# Patient Record
Sex: MALE | Race: WHITE | NOT HISPANIC OR LATINO | Employment: FULL TIME | URBAN - NONMETROPOLITAN AREA
[De-identification: names, ages, dates, MRNs, and addresses within clinical notes are randomized per-mention and may not be internally consistent; named-entity substitution may affect disease eponyms.]

---

## 2020-12-04 ENCOUNTER — HOSPITAL ENCOUNTER (EMERGENCY)
Facility: HOSPITAL | Age: 60
Discharge: HOME | End: 2020-12-04
Attending: NURSE PRACTITIONER | Admitting: NURSE PRACTITIONER
Payer: OTHER GOVERNMENT

## 2020-12-04 VITALS
SYSTOLIC BLOOD PRESSURE: 143 MMHG | TEMPERATURE: 96.4 F | HEART RATE: 64 BPM | RESPIRATION RATE: 16 BRPM | OXYGEN SATURATION: 99 % | DIASTOLIC BLOOD PRESSURE: 95 MMHG

## 2020-12-04 DIAGNOSIS — Z20.822 SUSPECTED 2019 NOVEL CORONAVIRUS INFECTION: Primary | ICD-10-CM

## 2020-12-04 DIAGNOSIS — U07.1 LAB TEST POSITIVE FOR DETECTION OF COVID-19 VIRUS: ICD-10-CM

## 2020-12-04 DIAGNOSIS — F17.210 CIGARETTE SMOKER: ICD-10-CM

## 2020-12-04 PROCEDURE — C9803 HOPD COVID-19 SPEC COLLECT: HCPCS

## 2020-12-04 PROCEDURE — G0463 HOSPITAL OUTPT CLINIC VISIT: HCPCS

## 2020-12-04 PROCEDURE — U0003 INFECTIOUS AGENT DETECTION BY NUCLEIC ACID (DNA OR RNA); SEVERE ACUTE RESPIRATORY SYNDROME CORONAVIRUS 2 (SARS-COV-2) (CORONAVIRUS DISEASE [COVID-19]), AMPLIFIED PROBE TECHNIQUE, MAKING USE OF HIGH THROUGHPUT TECHNOLOGIES AS DESCRIBED BY CMS-2020-01-R: HCPCS | Performed by: NURSE PRACTITIONER

## 2020-12-04 PROCEDURE — 99213 OFFICE O/P EST LOW 20 MIN: CPT | Performed by: NURSE PRACTITIONER

## 2020-12-04 ASSESSMENT — ENCOUNTER SYMPTOMS
ABDOMINAL PAIN: 0
SINUS PAIN: 0
DIARRHEA: 0
NAUSEA: 1
SORE THROAT: 0
LIGHT-HEADEDNESS: 1
MYALGIAS: 1
EYE PAIN: 0
HEADACHES: 1
EYE REDNESS: 0
FATIGUE: 0
TROUBLE SWALLOWING: 0
PALPITATIONS: 0
SINUS PRESSURE: 0
FEVER: 0
EYE ITCHING: 0
CHILLS: 0
SHORTNESS OF BREATH: 0
VOMITING: 0
COUGH: 0
PSYCHIATRIC NEGATIVE: 1

## 2020-12-04 NOTE — ED AVS SNAPSHOT
[facility]  [address] [place] [id number]  Phone: [phone]                                    Pollo Garrett   MRN: [id number]    Department: [facility]   Date of Visit: 12/4/2020           After Visit Summary Signature Page    I have received my discharge instructions, and my questions have been answered. I have discussed any challenges I see with this plan with the nurse or doctor.    ..........................................................................................................................................  Patient/Patient Representative Signature      ..........................................................................................................................................  Patient Representative Print Name and Relationship to Patient    ..................................................               ................................................  Date                                   Time    ..........................................................................................................................................  Reviewed by Signature/Title    ...................................................              ..............................................  Date                                               Time          22EPIC Rev 08/18

## 2020-12-05 NOTE — ED PROVIDER NOTES
History     Chief Complaint   Patient presents with     Covid 19 Testing     HPI  Pollo Garrett is a 60 year old male who presents to urgent care today for complaints of headache, lightheaded, muscle aches and nauseated over the last few days which have since resolved.  Does not have any current symptoms.  Patient states he had COVID + contact with someone 7 days ago.  Patient would like a COVID test.  Denies fever, chills, nausea, vomiting, diarrhea, SOB or chest pain. Denies any loss of taste or smell.  Denies pain. Current smoker- 1/2 pack per day.  No other concerns.      Allergies:  No Known Allergies    Problem List:    There are no active problems to display for this patient.       Past Medical History:    History reviewed. No pertinent past medical history.    Past Surgical History:    History reviewed. No pertinent surgical history.    Family History:    History reviewed. No pertinent family history.    Social History:  Marital Status:   [2]  Social History     Tobacco Use     Smoking status: None   Substance Use Topics     Alcohol use: None     Drug use: None      Medications:    No current outpatient medications on file.    Review of Systems   Constitutional: Negative for chills, fatigue and fever.   HENT: Negative for congestion, ear pain, sinus pressure, sinus pain, sore throat and trouble swallowing.    Eyes: Negative for pain, redness and itching.   Respiratory: Negative for cough and shortness of breath.    Cardiovascular: Negative for chest pain and palpitations.   Gastrointestinal: Positive for nausea. Negative for abdominal pain, diarrhea and vomiting.   Musculoskeletal: Positive for myalgias.   Neurological: Positive for light-headedness and headaches.   Psychiatric/Behavioral: Negative.      Physical Exam   BP: 143/95  Pulse: 64  Temp: 96.4  F (35.8  C)  Resp: 16  SpO2: 99 %    Physical Exam  Vitals signs and nursing note reviewed.   HENT:      Head: Normocephalic.      Right Ear:  Tympanic membrane, ear canal and external ear normal.      Left Ear: Tympanic membrane, ear canal and external ear normal.      Nose: Nose normal.      Mouth/Throat:      Mouth: Mucous membranes are moist.      Pharynx: Oropharynx is clear. No posterior oropharyngeal erythema.   Eyes:      Conjunctiva/sclera: Conjunctivae normal.      Pupils: Pupils are equal, round, and reactive to light.   Neck:      Musculoskeletal: Normal range of motion and neck supple. No neck rigidity.   Cardiovascular:      Rate and Rhythm: Normal rate and regular rhythm.      Pulses: Normal pulses.      Heart sounds: Normal heart sounds.   Pulmonary:      Effort: Pulmonary effort is normal.      Breath sounds: Normal breath sounds.   Lymphadenopathy:      Cervical: No cervical adenopathy.   Skin:     General: Skin is warm.      Capillary Refill: Capillary refill takes less than 2 seconds.   Neurological:      Mental Status: He is alert.   Psychiatric:         Mood and Affect: Mood normal.       ED Course     No results found for this or any previous visit (from the past 24 hour(s)).    Medications - No data to display    Assessments & Plan (with Medical Decision Making)     I have reviewed the nursing notes.    I have reviewed the findings, diagnosis, plan and need for follow up with the patient.  (Z20.828) Suspected 2019 novel coronavirus infection  (primary encounter diagnosis)  Plan: COVID-19 [facility] Loop Referral  Establish care with a primary care provider.    COVID TEST PENDING  COVID Test takes 48-72 hours to result.  If test is positive you will receive a phone call and if negative you will receive a [employer] message (if you have mychart) otherwise will receive a letter in the mail.      Symptomatic treatments recommended.  -Discussed that antibiotics would not help symptoms of viral URI. Education provided on symptoms of secondary bacterial infection such as new fever, chills, rigors, shortness of breath, increased work of  breathing, that can occur with viral URI and need for further evaluation, if they occur.   - Ensure you are staying hydrated by drinking plenty of fluids or eating foods such as popsicles, jello, pudding.  - Honey can be soothing for sore throat  - Warm salt water gurgles can help soothe sore throat  - Rest  - Humidifier can help with congestion and help keep mucus membranes such as throat and nose from drying out.  - Sleeping slightly propped up can help with congestion and postnasal drainage that can worsen cough at bedtime.  - As long as you have never been told to take Tylenol and/or Ibuprofen you can use them to manage fever and body aches per package instructions  Make sure you eat when you take ibuprofen to avoid stomach upset.  - OTC cough medications per package instructions to help with cough. Check to see if the cough/cold medication already has acetaminophen (Tylenol) in it. If it does avoid taking additional Tylenol.  - If sudden onset of new fever, worsening symptoms return for further evaluation.  - OTC nasal steroid such as Flonase can help decrease sinus inflammation to help with congestion.  - Education provided on symptoms of post-viral bacterial infections including ear infection and pneumonia. This would require re-evaluation for treatment.    Return to urgent care/ED with any worsening in condition or additional concerns.    There are no discharge medications for this patient.      Final diagnoses:   Suspected 2019 novel coronavirus infection       12/4/2020   [facility]     Susy Dexter NP  12/04/20 1119

## 2020-12-05 NOTE — ED TRIAGE NOTES
Pt presents with wanting a COVID test since he has the symptoms: headache, lightheadedness,nausea since today.

## 2020-12-05 NOTE — DISCHARGE INSTRUCTIONS
Establish care with a primary care provider.    COVID TEST PENDING  COVID Test takes 48-72 hours to result.  If test is positive you will receive a phone call and if negative you will receive a [employer] message (if you have mychart) otherwise will receive a letter in the mail.      Symptomatic treatments recommended.  -Discussed that antibiotics would not help symptoms of viral URI. Education provided on symptoms of secondary bacterial infection such as new fever, chills, rigors, shortness of breath, increased work of breathing, that can occur with viral URI and need for further evaluation, if they occur.   - Ensure you are staying hydrated by drinking plenty of fluids or eating foods such as popsicles, jello, pudding.  - Honey can be soothing for sore throat  - Warm salt water gurgles can help soothe sore throat  - Rest  - Humidifier can help with congestion and help keep mucus membranes such as throat and nose from drying out.  - Sleeping slightly propped up can help with congestion and postnasal drainage that can worsen cough at bedtime.  - As long as you have never been told to take Tylenol and/or Ibuprofen you can use them to manage fever and body aches per package instructions  Make sure you eat when you take ibuprofen to avoid stomach upset.  - OTC cough medications per package instructions to help with cough. Check to see if the cough/cold medication already has acetaminophen (Tylenol) in it. If it does avoid taking additional Tylenol.  - If sudden onset of new fever, worsening symptoms return for further evaluation.  - OTC nasal steroid such as Flonase can help decrease sinus inflammation to help with congestion.  - Education provided on symptoms of post-viral bacterial infections including ear infection and pneumonia. This would require re-evaluation for treatment.    Return to urgent care/ED with any worsening in condition or additional concerns.

## 2020-12-06 ENCOUNTER — TELEPHONE (OUTPATIENT)
Dept: LAB | Facility: HOSPITAL | Age: 60
End: 2020-12-06

## 2020-12-06 ENCOUNTER — NURSE TRIAGE (OUTPATIENT)
Dept: NURSING | Facility: CLINIC | Age: 60
End: 2020-12-06

## 2020-12-06 LAB
SARS-COV-2 RNA SPEC QL NAA+PROBE: ABNORMAL
SPECIMEN SOURCE: ABNORMAL

## 2020-12-06 NOTE — TELEPHONE ENCOUNTER
Coronavirus (COVID-19) Notification    Reason for call  Notify of POSITIVE  COVID-19 lab result, assess symptoms,  review [facility] recommendations    Lab Result   Lab test for 2019-nCoV rRt-PCR or SARS-COV-2 PCR  Oropharyngeal AND/OR nasopharyngeal swabs were POSITIVE for 2019-nCoV RNA [OR] SARS-COV-2 RNA (COVID-19) RNA     We have been unable to reach Patient by phone at this time to notify of their Positive COVID-19 result.  Left voicemail message requesting a call back to [phone] [facility] for results.        POSITIVE COVID-19 Letter sent.    Peg Campbell RN

## 2020-12-06 NOTE — TELEPHONE ENCOUNTER
"Calling for result. FNA reviewed letter. Pedro's wife expresses upset at the recommendations and says it's impossible for them to isolate from each other at home and she'll have to go out to stores. FNA advised they try neighbors, friends or delivery. Clarified guidelines for going back to work.    Advised to call back if symptoms worsen or with further questions.   Patient voiced understanding and will follow disposition.   Agatha Becker RN  [facility] Nurse Advisor          Coronavirus (COVID-19) Notification    Caller Name (Patient, parent, daughter/son, grandparent, etc)  Pedro    Reason for call  Notify of Positive Coronavirus (COVID-19) lab results, assess symptoms,  review [facility] recommendations    Lab Result    Lab test:  2019-nCoV rRt-PCR or SARS-CoV-2 PCR    Oropharyngeal AND/OR nasopharyngeal swabs is POSITIVE for 2019-nCoV RNA/SARS-COV-2 PCR (COVID-19 virus)    RN Recommendations/Instructions per [facility] Coronavirus COVID-19 recommendations    Brief introduction script  Introduce self then review script:  \"I am calling on behalf of [employer].  We were notified that your Coronavirus test (COVID-19) for was POSITIVE for the virus.  I have some information to relay to you but first I wanted to mention that the [place] Dept of Health will be contacting you shortly [it's possible MD already called Patient] to talk to you more about how you are feeling and other people you have had contact with who might now also have the virus.  Also, [facility] is Partnering with the [facility] for Covid-19 research, you may be contacted directly by research staff.\"    Assessment (Inquire about Patient's current symptoms)   Assessment   Current Symptoms at time of phone call: (if no symptoms, document No symptoms] Stuffy nose   Symptoms onset (if applicable) 12/2     If at time of call, Patients symptoms hare worsened, the Patient should contact 911 or have someone drive them to Emergency Dept " promptly:      If Patient calling 911, inform 911 personal that you have tested positive for the Coronavirus (COVID-19).  Place mask on and await 911 to arrive.    If Emergency Dept, If possible, please have another adult drive you to the Emergency Dept but you need to wear mask when in contact with other people.      Review information with Patient    Your result was positive. This means you have COVID-19 (coronavirus).  We have sent you a letter that reviews the information that I'll be reviewing with you now.    How can I protect others?    If you have symptoms: stay home and away from others (self-isolate) until:    You've had no fever--and no medicine that reduces fever--for 1 full day (24 hours). And       Your other symptoms have gotten better. For example, your cough or breathing has improved. And     At least 10 days have passed since your symptoms started. (If you've been told by a doctor that you have a weak immune system, wait 20 days.)     If you don't have symptoms: Stay home and away from others (self-isolate) until at least 10 days have passed since your first positive COVID-19 test. (Date test collected)    During this time:    Stay in your own room, including for meals. Use your own bathroom if you can.    Stay away from others in your home. No hugging, kissing or shaking hands. No visitors.     Don't go to work, school or anywhere else.     Clean  high touch  surfaces often (doorknobs, counters, handles, etc.). Use a household cleaning spray or wipes. You'll find a full list on the EPA website at www.epa.gov/pesticide-registration/list-n-disinfectants-use-against-sars-cov-2.     Cover your mouth and nose with a mask, tissue or other face covering to avoid spreading germs.    Wash your hands and face often with soap and water.    Caregivers in these groups are at risk for severe illness due to COVID-19:  o People 65 years and older  o People who live in a nursing home or long-term care  facility  o People with chronic disease (lung, heart, cancer, diabetes, kidney, liver, immunologic)  o People who have a weakened immune system, including those who:  - Are in cancer treatment  - Take medicine that weakens the immune system, such as corticosteroids  - Had a bone marrow or organ transplant  - Have an immune deficiency  - Have poorly controlled HIV or AIDS  - Are obese (body mass index of 40 or higher)  - Smoke regularly    Caregivers should wear gloves while washing dishes, handling laundry and cleaning bedrooms and bathrooms.    Wash and dry laundry with special caution. Don't shake dirty laundry, and use the warmest water setting you can.    If you have a weakened immune system, ask your doctor about other actions you should take.    For more tips, go to www.cdc.gov/coronavirus/2019-ncov/downloads/10Things.pdf.    You should not go back to work until you meet the guidelines above for ending your home isolation. You don't need to be retested for COVID-19 before going back to work--studies show that you won't spread the virus if it's been at least 10 days since your symptoms started (or 20 days, if you have a weak immune system).    Employers: This document serves as formal notice of your employee's medical guidelines for going back to work. They must meet the above guidelines before going back to work in person.    How can I take care of myself?    1. Get lots of rest. Drink extra fluids (unless a doctor has told you not to).    2. Take Tylenol (acetaminophen) for fever or pain. If you have liver or kidney problems, ask your family doctor if it's okay to take Tylenol.     Take either:     650 mg (two 325 mg pills) every 4 to 6 hours, or     1,000 mg (two 500 mg pills) every 8 hours as needed.     Note: Don't take more than 3,000 mg in one day. Acetaminophen is found in many medicines (both prescribed and over-the-counter medicines). Read all labels to be sure you don't take too much.    For  children, check the Tylenol bottle for the right dose (based on their age or weight).    3. If you have other health problems (like cancer, heart failure, an organ transplant or severe kidney disease): Call your specialty clinic if you don't feel better in the next 2 days.    4. Know when to call 911: Emergency warning signs include:    Trouble breathing or shortness of breath    Pain or pressure in the chest that doesn't go away    Feeling confused like you haven't felt before, or not being able to wake up    Bluish-colored lips or face    5. Sign up for [employer]. We know it's scary to hear that you have COVID-19. We want to track your symptoms to make sure you're okay over the next 2 weeks. Please look for an email from [employer]--this is a free, online program that we'll use to keep in touch. To sign up, follow the link in the email. Learn more at [email or web address].    Where can I get more information?    [facility]: [email or web address]    Coronavirus Basics: www.health.[facility].[place]/diseases/coronavirus/basics.html    What to Do If You're Sick: www.cdc.gov/coronavirus/2019-ncov/about/steps-when-sick.html    Ending Home Isolation: www.cdc.gov/coronavirus/2019-ncov/hcp/disposition-in-home-patients.html     Caring for Someone with COVID-19: www.cdc.gov/coronavirus/2019-ncov/if-you-are-sick/care-for-someone.html     [facility] clinical trials (COVID-19 research studies): clinicalaffairs.[facility]/n-clinical-trials     A Positive COVID-19 letter will be sent via [employer] or the mail. (Exception, no letters sent to Presurgerical/Preprocedure Patients)    Agatha Becker RN    Additional Information    Negative: [1] Caller is not with the adult (patient) AND [2] reporting urgent symptoms    Negative: Lab result questions    Negative: Medication questions    Negative: Caller can't be reached by phone    Negative: Caller has already spoken to PCP or another triager     Negative: RN needs further essential information from caller in order to complete triage    Negative: Requesting regular office appointment    Negative: [1] Caller requesting NON-URGENT health information AND [2] PCP's office is the best resource    [1] Follow-up call to recent contact AND [2] information only call, no triage required    Negative: Question about upcoming scheduled test, no triage required and triager able to answer question    Negative: [1] Caller is not with the adult (patient) AND [2] probable NON-URGENT symptoms    Negative: General information question, no triage required and triager able to answer question    Negative: Health Information question, no triage required and triager able to answer question    Protocols used: INFORMATION ONLY CALL-A-

## 2021-12-13 ENCOUNTER — IMMUNIZATION (OUTPATIENT)
Dept: FAMILY MEDICINE | Facility: OTHER | Age: 61
End: 2021-12-13
Attending: FAMILY MEDICINE
Payer: COMMERCIAL

## 2021-12-13 PROCEDURE — 91300 PR COVID VAC PFIZER DIL RECON 30 MCG/0.3 ML IM: CPT

## 2021-12-13 PROCEDURE — 0004A PR COVID VAC PFIZER DIL RECON 30 MCG/0.3 ML IM: CPT

## 2022-01-07 NOTE — PROGRESS NOTES
Assessment & Plan     Encounter to establish care  Will update labs and preventive cares today  - Comprehensive metabolic panel (BMP + Alb, Alk Phos, ALT, AST, Total. Bili, TP)  - CBC with platelets    HLD  - Lipid Profile (Chol, Trig, HDL, LDL calc)  - ASCVD risk of 12%, recommendation to start crestor 10mg. LFT and Cr wnl  - recheck LFTs one to two months. Lab order placed     Varicose veins of right lower extremity with pain  - celecoxib (CELEBREX) 100 MG capsule; Take 1 capsule (100 mg) by mouth 2 times daily  - no NSAIDs if using celebrex  - heat for pain  - Compression Sleeve/Stocking Order for DME - ONLY FOR DME  - Vascular Surgery Referral; Future  - US Venous Competency Right; Future    Special screening for malignant neoplasms, colon  No FHx of colon cancer. No PHx of colon polyps. Discussed risks and benefits of cologuard including if test is positive, need for colonoscopy. If test is negative, recommendation is to repeat in 3 years.  Pedro is in agreement and wishes to proceed with the cologuard.   - COLOGUARD([employer])    Screening for prostate cancer  - PSA, screen. wnl    Personal history of tobacco use  - Prof fee: Shared Decisionmaking for Lung Cancer Screening  - CT Chest Lung Cancer Scrn Low Dose wo; Future    Need for influenza vaccination  - INFLUENZA QUAD, PF (RIV4) (FLUBLOK)    Tobacco Cessation:   reports that he has been smoking. He started smoking about 44 years ago. He has a 32.25 pack-year smoking history. He has never used smokeless tobacco.  Tobacco Cessation Action Plan: Information offered: Patient not interested at this time    See Patient Instructions    Return in about 1 year (around 1/10/2023), or if symptoms worsen or fail to improve, for Lab Work, Physical Exam.    Kasia Diggs MD  [facility] - ARNOLD Abad is a 61 year old who presents for the following health issues     HPI     Concern - Varicose Veins Right Leg  Onset:Ongoing -  Recently started hurting a year ago  Description: Right side of leg - varicose veins  Intensity: severe, 10/10  Progression of Symptoms:  worsening and constant  Accompanying Signs & Symptoms: Works on concrete 8 hours daily, wears supportive shoes - after six hours pain starts  Previous history of similar problem: na  Precipitating factors:        Worsened by: na  Alleviating factors:        Improved by: na  Therapies tried and outcome: Aleve which is not working    - using aleve q12h daily, not helping        # smoking   - has tried chantix which resulted in mood issues    Labs: does not remember last time he had labs       Review of Systems   Constitutional: Negative for chills and fever.   HENT: Negative for congestion.    Respiratory: Negative for shortness of breath and wheezing.    Cardiovascular: Negative for chest pain and palpitations.   Gastrointestinal: Negative for abdominal pain.   Genitourinary: Negative for difficulty urinating.   Musculoskeletal: Positive for arthralgias (right leg pain).   Psychiatric/Behavioral: Negative for mood changes.          Objective    /86 (BP Location: Left arm, Patient Position: Chair, Cuff Size: Adult Regular)   Pulse 67   Temp 97.2  F (36.2  C) (Tympanic)   Resp 18   Wt 82.1 kg (181 lb)   SpO2 100%   There is no height or weight on file to calculate BMI.  Physical Exam  Constitutional:       General: He is not in acute distress.     Appearance: He is well-developed.   HENT:      Head: Normocephalic and atraumatic.      Right Ear: Tympanic membrane normal.      Left Ear: Tympanic membrane normal.      Mouth/Throat:      Mouth: Mucous membranes are moist.      Pharynx: No oropharyngeal exudate.   Eyes:      Extraocular Movements: Extraocular movements intact.      Conjunctiva/sclera: Conjunctivae normal.   Neck:      Thyroid: No thyromegaly.   Cardiovascular:      Rate and Rhythm: Normal rate and regular rhythm.      Pulses: Normal pulses.      Heart sounds:  No murmur heard.      Pulmonary:      Effort: Pulmonary effort is normal. No respiratory distress.      Breath sounds: No wheezing or rales.   Abdominal:      General: Bowel sounds are normal. There is no distension.      Palpations: Abdomen is soft.      Tenderness: There is no abdominal tenderness. There is no guarding.   Musculoskeletal:         General: Normal range of motion.      Cervical back: Normal range of motion and neck supple.      Right lower leg: No edema.      Left lower leg: No edema.   Lymphadenopathy:      Cervical: No cervical adenopathy.   Skin:     General: Skin is warm and dry.      Comments: Right leg varicose veins. No s/s of infection   Neurological:      Mental Status: He is alert.   Psychiatric:         Mood and Affect: Mood is depressed (d/t cold weather).          Results for orders placed or performed in visit on 01/10/22 (from the past 24 hour(s))   Comprehensive metabolic panel (BMP + Alb, Alk Phos, ALT, AST, Total. Bili, TP)   Result Value Ref Range    Sodium 137 133 - 144 mmol/L    Potassium 4.5 3.4 - 5.3 mmol/L    Chloride 107 94 - 109 mmol/L    Carbon Dioxide (CO2) 27 20 - 32 mmol/L    Anion Gap 3 3 - 14 mmol/L    Urea Nitrogen 19 7 - 30 mg/dL    Creatinine 0.94 0.66 - 1.25 mg/dL    Calcium 9.3 8.5 - 10.1 mg/dL    Glucose 106 (H) 70 - 99 mg/dL    Alkaline Phosphatase 66 40 - 150 U/L    AST 13 0 - 45 U/L    ALT 31 0 - 70 U/L    Protein Total 7.7 6.8 - 8.8 g/dL    Albumin 4.0 3.4 - 5.0 g/dL    Bilirubin Total 1.0 0.2 - 1.3 mg/dL    GFR Estimate >90 >60 mL/min/1.73m2   CBC with platelets   Result Value Ref Range    WBC Count 4.3 4.0 - 11.0 10e3/uL    RBC Count 4.85 4.40 - 5.90 10e6/uL    Hemoglobin 15.6 13.3 - 17.7 g/dL    Hematocrit 47.7 40.0 - 53.0 %    MCV 98 78 - 100 fL    MCH 32.2 26.5 - 33.0 pg    MCHC 32.7 31.5 - 36.5 g/dL    RDW 12.8 10.0 - 15.0 %    Platelet Count 230 150 - 450 10e3/uL   Lipid Profile (Chol, Trig, HDL, LDL calc)   Result Value Ref Range    Cholesterol 222  (H) <200 mg/dL    Triglycerides 89 <150 mg/dL    Direct Measure HDL 76 >=40 mg/dL    LDL Cholesterol Calculated 128 (H) <=100 mg/dL    Non HDL Cholesterol 146 (H) <130 mg/dL    Patient Fasting > 8hrs? Yes     Narrative    Cholesterol  Desirable:  <200 mg/dL    Triglycerides  Normal:  Less than 150 mg/dL  Borderline High:  150-199 mg/dL  High:  200-499 mg/dL  Very High:  Greater than or equal to 500 mg/dL    Direct Measure HDL  Female:  Greater than or equal to 50 mg/dL   Male:  Greater than or equal to 40 mg/dL    LDL Cholesterol  Desirable:  <100mg/dL  Above Desirable:  100-129 mg/dL   Borderline High:  130-159 mg/dL   High:  160-189 mg/dL   Very High:  >= 190 mg/dL    Non HDL Cholesterol  Desirable:  130 mg/dL  Above Desirable:  130-159 mg/dL  Borderline High:  160-189 mg/dL  High:  190-219 mg/dL  Very High:  Greater than or equal to 220 mg/dL   PSA, screen   Result Value Ref Range    Prostate Specific Antigen Screen 0.08 0.00 - 4.00 ug/L    Narrative    Assay Method:  Chemiluminescence using Siemens   Vista analyzer.     The 10-year ASCVD risk score (Clevelanddolores VALENCIA Jr., et al., 2013) is: 12%    Values used to calculate the score:      Age: 61 years      Sex: Male      Is Non- : No      Diabetic: No      Tobacco smoker: Yes      Systolic Blood Pressure: 120 mmHg      Is BP treated: No      HDL Cholesterol: 76 mg/dL      Total Cholesterol: 222 mg/dL            Lung Cancer Screening Shared Decision Making Visit     Pollo Garrett is eligible for lung cancer screening on the basis of the information provided in my signed lung cancer screening order.     I have discussed with patient the risks and benefits of screening for lung cancer with low-dose CT.     The risks include:  radiation exposure: one low dose chest CT has as much ionizing radiation as about 15 chest x-rays or 6 months of background radiation living in [place]    false positives: 96% of positive findings/nodules are NOT cancer, but  some might still require additional diagnostic evaluation, including biopsy  over-diagnosis: some slow growing cancers that might never have been clinically significant will be detected and treated unnecessarily     The benefit of early detection of lung cancer is contingent upon adherence to annual screening or more frequent follow up if indicated.     Furthermore, reaping the benefits of screening requires Pollo Garrett to be willing and physically able to undergo diagnostic procedures, if indicated. Although no specific guide is available for determining severity of comorbidities, it is reasonable to withhold screening in patients who have greater mortality risk from other diseases.     We did discuss that the only way to prevent lung cancer is to not smoke. Smoking cessation counseling was given, duration < 3 minutes.      I did not offer risk estimation using a calculator such as this one:    [id number]}

## 2022-01-10 ENCOUNTER — OFFICE VISIT (OUTPATIENT)
Dept: FAMILY MEDICINE | Facility: OTHER | Age: 62
End: 2022-01-10
Attending: FAMILY MEDICINE
Payer: COMMERCIAL

## 2022-01-10 VITALS
RESPIRATION RATE: 18 BRPM | WEIGHT: 181 LBS | TEMPERATURE: 97.2 F | OXYGEN SATURATION: 100 % | DIASTOLIC BLOOD PRESSURE: 86 MMHG | SYSTOLIC BLOOD PRESSURE: 120 MMHG | HEART RATE: 67 BPM

## 2022-01-10 DIAGNOSIS — Z12.5 SCREENING FOR PROSTATE CANCER: ICD-10-CM

## 2022-01-10 DIAGNOSIS — Z87.891 PERSONAL HISTORY OF TOBACCO USE: ICD-10-CM

## 2022-01-10 DIAGNOSIS — E78.2 MIXED HYPERLIPIDEMIA: ICD-10-CM

## 2022-01-10 DIAGNOSIS — Z12.11 SPECIAL SCREENING FOR MALIGNANT NEOPLASMS, COLON: ICD-10-CM

## 2022-01-10 DIAGNOSIS — I83.811 VARICOSE VEINS OF RIGHT LOWER EXTREMITY WITH PAIN: ICD-10-CM

## 2022-01-10 DIAGNOSIS — Z23 NEED FOR INFLUENZA VACCINATION: Primary | ICD-10-CM

## 2022-01-10 DIAGNOSIS — Z76.89 ENCOUNTER TO ESTABLISH CARE: ICD-10-CM

## 2022-01-10 PROBLEM — F17.200 TOBACCO USE DISORDER: Status: ACTIVE | Noted: 2018-06-09

## 2022-01-10 PROBLEM — M19.049 ARTHRITIS OF HAND: Status: ACTIVE | Noted: 2017-03-20

## 2022-01-10 PROBLEM — M25.562 ACUTE PAIN OF LEFT KNEE: Status: ACTIVE | Noted: 2018-06-05

## 2022-01-10 PROBLEM — F41.1 ANXIETY STATE: Status: ACTIVE | Noted: 2018-06-09

## 2022-01-10 LAB
ALBUMIN SERPL-MCNC: 4 G/DL (ref 3.4–5)
ALP SERPL-CCNC: 66 U/L (ref 40–150)
ALT SERPL W P-5'-P-CCNC: 31 U/L (ref 0–70)
ANION GAP SERPL CALCULATED.3IONS-SCNC: 3 MMOL/L (ref 3–14)
AST SERPL W P-5'-P-CCNC: 13 U/L (ref 0–45)
BILIRUB SERPL-MCNC: 1 MG/DL (ref 0.2–1.3)
BUN SERPL-MCNC: 19 MG/DL (ref 7–30)
CALCIUM SERPL-MCNC: 9.3 MG/DL (ref 8.5–10.1)
CHLORIDE BLD-SCNC: 107 MMOL/L (ref 94–109)
CHOLEST SERPL-MCNC: 222 MG/DL
CO2 SERPL-SCNC: 27 MMOL/L (ref 20–32)
CREAT SERPL-MCNC: 0.94 MG/DL (ref 0.66–1.25)
ERYTHROCYTE [DISTWIDTH] IN BLOOD BY AUTOMATED COUNT: 12.8 % (ref 10–15)
FASTING STATUS PATIENT QL REPORTED: YES
GFR SERPL CREATININE-BSD FRML MDRD: >90 ML/MIN/1.73M2
GLUCOSE BLD-MCNC: 106 MG/DL (ref 70–99)
HCT VFR BLD AUTO: 47.7 % (ref 40–53)
HDLC SERPL-MCNC: 76 MG/DL
HGB BLD-MCNC: 15.6 G/DL (ref 13.3–17.7)
LDLC SERPL CALC-MCNC: 128 MG/DL
MCH RBC QN AUTO: 32.2 PG (ref 26.5–33)
MCHC RBC AUTO-ENTMCNC: 32.7 G/DL (ref 31.5–36.5)
MCV RBC AUTO: 98 FL (ref 78–100)
NONHDLC SERPL-MCNC: 146 MG/DL
PLATELET # BLD AUTO: 230 10E3/UL (ref 150–450)
POTASSIUM BLD-SCNC: 4.5 MMOL/L (ref 3.4–5.3)
PROT SERPL-MCNC: 7.7 G/DL (ref 6.8–8.8)
PSA SERPL-MCNC: 0.08 UG/L (ref 0–4)
RBC # BLD AUTO: 4.85 10E6/UL (ref 4.4–5.9)
SODIUM SERPL-SCNC: 137 MMOL/L (ref 133–144)
TRIGL SERPL-MCNC: 89 MG/DL
WBC # BLD AUTO: 4.3 10E3/UL (ref 4–11)

## 2022-01-10 PROCEDURE — 90471 IMMUNIZATION ADMIN: CPT | Performed by: FAMILY MEDICINE

## 2022-01-10 PROCEDURE — 80061 LIPID PANEL: CPT | Performed by: FAMILY MEDICINE

## 2022-01-10 PROCEDURE — 90682 RIV4 VACC RECOMBINANT DNA IM: CPT | Performed by: FAMILY MEDICINE

## 2022-01-10 PROCEDURE — G0296 VISIT TO DETERM LDCT ELIG: HCPCS | Performed by: FAMILY MEDICINE

## 2022-01-10 PROCEDURE — 80053 COMPREHEN METABOLIC PANEL: CPT | Performed by: FAMILY MEDICINE

## 2022-01-10 PROCEDURE — G0103 PSA SCREENING: HCPCS | Performed by: FAMILY MEDICINE

## 2022-01-10 PROCEDURE — 36415 COLL VENOUS BLD VENIPUNCTURE: CPT | Performed by: FAMILY MEDICINE

## 2022-01-10 PROCEDURE — 85027 COMPLETE CBC AUTOMATED: CPT | Performed by: FAMILY MEDICINE

## 2022-01-10 PROCEDURE — 99204 OFFICE O/P NEW MOD 45 MIN: CPT | Mod: 25 | Performed by: FAMILY MEDICINE

## 2022-01-10 RX ORDER — COVID-19 ANTIGEN TEST
220 KIT MISCELLANEOUS 2 TIMES DAILY WITH MEALS
COMMUNITY

## 2022-01-10 RX ORDER — ROSUVASTATIN CALCIUM 10 MG/1
10 TABLET, COATED ORAL DAILY
Qty: 90 TABLET | Refills: 1 | Status: SHIPPED | OUTPATIENT
Start: 2022-01-10 | End: 2023-03-30

## 2022-01-10 RX ORDER — CELECOXIB 100 MG/1
100 CAPSULE ORAL 2 TIMES DAILY
Qty: 60 CAPSULE | Refills: 1 | Status: SHIPPED | OUTPATIENT
Start: 2022-01-10 | End: 2023-03-30

## 2022-01-10 ASSESSMENT — ENCOUNTER SYMPTOMS
FEVER: 0
SHORTNESS OF BREATH: 0
CHILLS: 0
ARTHRALGIAS: 1
DIFFICULTY URINATING: 0
WHEEZING: 0
ABDOMINAL PAIN: 0
PALPITATIONS: 0

## 2022-01-10 ASSESSMENT — PAIN SCALES - GENERAL: PAINLEVEL: MODERATE PAIN (5)

## 2022-01-10 NOTE — NURSING NOTE
Chief Complaint   Patient presents with     Establish Care       Initial /86 (BP Location: Left arm, Patient Position: Chair, Cuff Size: Adult Regular)   Pulse 67   Temp 97.2  F (36.2  C) (Tympanic)   Resp 18   Wt 82.1 kg (181 lb)   SpO2 100%  There is no height or weight on file to calculate BMI.  Medication Reconciliation: complete  Mary Jo Le LPN

## 2022-01-10 NOTE — PATIENT INSTRUCTIONS
"It was nice to meet you today.     We put referral to vascular surgery   We put in an order for US of your leg    Try celebrex for your leg pain. Do not take any NSAIDs (ibuprofen, aleve, motrin) when taking celebrex  Try alternating with Tylenol   Try heat for your leg pain  We put in prescription for compression socks    We will call you with your lab results.   We put in an order cologuard    Check with your insurance or pharmacist about the new shingles vaccine (Shingrix) - if it is covered and you wish to return for it you can make a nurse only visit. Remember it is 2 shots, the first at time \"Zero\" and the second 2-6 months later.       Lung Cancer Screening   Frequently Asked Questions  If you are at high-risk for lung cancer, getting screened with low-dose computed tomography (LDCT) every year can help save your life. This handout offers answers to some of the most common questions about lung cancer screening. If you have other questions, please call [phone] ([phone]).     What is it?  Lung cancer screening uses special X-ray technology to create an image of your lung tissue. The exam is quick and easy and takes less than 10 seconds. We don t give you any medicine or use any needles. You can eat before and after the exam. You don t need to change your clothes as long as the clothing on your chest doesn t contain metal. But, you do need to be able to hold your breath for at least 6 seconds during the exam.    What is the goal of lung cancer screening?  The goal of lung cancer screening is to save lives. Many times, lung cancer is not found until a person starts having physical symptoms. Lung cancer screening can help detect lung cancer in the earliest stages when it may be easier to treat.    Who should be screened for lung cancer?  We suggest lung cancer screening for anyone who is at high-risk for lung cancer. You are in the high-risk group if you:      are between the ages of 55 and 79, " and    have smoked at least 1 pack of cigarettes a day for 30 or more years, and    still smoke or have quit within the past 15 years.    However, if you have a new cough or shortness of breath, you should talk to your doctor before being screened.    Some national lung health advocacy groups also recommend screening for people ages 50 to 79 who have smoked an average of 1 pack of cigarettes a day for 20 years. They must also have at least 1 other risk factor for lung cancer, not including exposure to secondhand smoke. Other risk factors are having had cancer in the past, emphysema, pulmonary fibrosis, COPD, a family history of lung cancer, or exposure to certain materials such as arsenic, asbestos, beryllium, cadmium, chromium, diesel fumes, nickel, radon or silica. Your care team can help you know if you have one of these risk factors.     Why does it matter if I have symptoms?  Certain symptoms can be a sign that you have a condition in your lungs that should be checked and treated by your doctor. These symptoms include fever, chest pain, a new or changing cough, shortness of breath that you have never felt before, coughing up blood or unexplained weight loss. Having any of these symptoms can greatly affect the results of lung cancer screening.       Should all smokers get an LDCT lung cancer screening exam?  It depends. Lung cancer screening is for a very specific group of men and women who have a history of heavy smoking over a long period of time (see  Who should be screened for lung cancer  above).  I am in the high-risk group, but have been diagnosed with cancer in the past. Is LDCT lung cancer screening right for me?  In some cases, you should not have LDCT lung screening, such as when your doctor is already following your cancer with CT scan studies. Your doctor will help you decide if LDCT lung screening is right for you.  Do I need to have a screening exam every year?  Yes. If you are in the high-risk  group described earlier, you should get an LDCT lung cancer screening exam every year until you are 79, or are no longer willing or able to undergo screening and possible procedures to diagnose and treat lung cancer.  How effective is LDCT at preventing death from lung cancer?  Studies have shown that LDCT lung cancer screening can lower the risk of death from lung cancer by 20 percent in people who are at high-risk.  What are the risks?  There are some risks and limitations of LDCT lung cancer screening. We want to make sure you understand the risks and benefits, so please let us know if you have any questions. Your doctor may want to talk with you more about these risks.    Radiation exposure: As with any exam that uses radiation, there is a very small increased risk of cancer. The amount of radiation in LDCT is small--about the same amount a person would get from a mammogram. Your doctor orders the exam when he or she feels the potential benefits outweigh the risks.    False negatives: No test is perfect, including LDCT. It is possible that you may have a medical condition, including lung cancer, that is not found during your exam. This is called a false negative result.    False positives and more testing: LDCT very often finds something in the lung that could be cancer, but in fact is not. This is called a false positive result. False positive tests often cause anxiety. To make sure these findings are not cancer, you may need to have more tests. These tests will be done only if you give us permission. Sometimes patients need a treatment that can have side effects, such as a biopsy. For more information on false positives, see  What can I expect from the results?     Findings not related to lung cancer: Your LDCT exam also takes pictures of areas of your body next to your lungs. In a very small number of cases, the CT scan will show an abnormal finding in one of these areas, such as your kidneys, adrenal glands,  liver or thyroid. This finding may not be serious, but you may need more tests. Your doctor can help you decide what other tests you may need, if any.  What can I expect from the results?  About 1 out of 4 LDCT exams will find something that may need more tests. Most of the time, these findings are lung nodules. Lung nodules are very small collections of tissue in the lung. These nodules are very common, and the vast majority--more than 97 percent--are not cancer (benign). Most are normal lymph nodes or small areas of scarring from past infections.  But, if a small lung nodule is found to be cancer, the cancer can be cured more than 90 percent of the time. To know if the nodule is cancer, we may need to get more images before your next yearly screening exam. If the nodule has suspicious features (for example, it is large, has an odd shape or grows over time), we will refer you to a specialist for further testing.  Will my doctor also get the results?  Yes. Your doctor will get a copy of your results.  Is it okay to keep smoking now that there s a cancer screening exam?  No. Tobacco is one of the strongest cancer-causing agents. It causes not only lung cancer, but other cancers and cardiovascular (heart) diseases as well. The damage caused by smoking builds over time. This means that the longer you smoke, the higher your risk of disease. While it is never too late to quit, the sooner you quit, the better.  Where can I find help to quit smoking?  The best way to prevent lung cancer is to stop smoking. If you have already quit smoking, congratulations and keep it up! For help on quitting smoking, please call [employer] at 7-144-418-PYOC ([phone]) or the American [employer] Society at 1-219.699.5590 to find local resources near you.  One-on-one health coaching:  If you d prefer to work individually with a health care provider on tobacco cessation, we offer:      Medication Therapy Management:  Our specially trained pharmacists  work closely with you and your doctor to help you quit smoking.  Call [phone] or [phone] (toll free).     Can Do: Health coaching offered by [facility].  [email or web address]

## 2022-01-14 ENCOUNTER — HOSPITAL ENCOUNTER (OUTPATIENT)
Dept: ULTRASOUND IMAGING | Facility: HOSPITAL | Age: 62
End: 2022-01-14
Attending: FAMILY MEDICINE
Payer: COMMERCIAL

## 2022-01-14 ENCOUNTER — HOSPITAL ENCOUNTER (OUTPATIENT)
Dept: CT IMAGING | Facility: HOSPITAL | Age: 62
End: 2022-01-14
Attending: FAMILY MEDICINE
Payer: COMMERCIAL

## 2022-01-14 DIAGNOSIS — Z87.891 PERSONAL HISTORY OF TOBACCO USE: ICD-10-CM

## 2022-01-14 DIAGNOSIS — I83.811 VARICOSE VEINS OF RIGHT LOWER EXTREMITY WITH PAIN: ICD-10-CM

## 2022-01-14 PROCEDURE — 93971 EXTREMITY STUDY: CPT | Mod: RT

## 2022-01-14 PROCEDURE — 71271 CT THORAX LUNG CANCER SCR C-: CPT

## 2022-01-27 ENCOUNTER — TELEPHONE (OUTPATIENT)
Dept: FAMILY MEDICINE | Facility: OTHER | Age: 62
End: 2022-01-27
Payer: COMMERCIAL

## 2022-01-27 DIAGNOSIS — G89.29 CHRONIC PAIN OF RIGHT KNEE: Primary | ICD-10-CM

## 2022-01-27 DIAGNOSIS — M25.561 CHRONIC PAIN OF RIGHT KNEE: Primary | ICD-10-CM

## 2022-01-27 NOTE — TELEPHONE ENCOUNTER
2:28 PM    Reason for Call: Phone Call    Description: pt will need a referal for a orthopedic surgeon. Pt saw the vascular doctor stated not his veins but it is his right knee that needs fixing. Please call pt.    Was an appointment offered for this call? No  If yes : Appointment type              Date    Preferred method for responding to this message: Telephone Call  What is your phone number ? [phone]     If we cannot reach you directly, may we leave a detailed response at the number you provided? Yes    Can this message wait until your PCP/provider returns, if available today? YES    Angely Nix

## 2023-03-18 ENCOUNTER — HOSPITAL ENCOUNTER (EMERGENCY)
Facility: HOSPITAL | Age: 63
Discharge: HOME | End: 2023-03-18
Attending: PHYSICIAN ASSISTANT | Admitting: PHYSICIAN ASSISTANT
Payer: COMMERCIAL

## 2023-03-18 VITALS
HEART RATE: 65 BPM | RESPIRATION RATE: 18 BRPM | OXYGEN SATURATION: 98 % | SYSTOLIC BLOOD PRESSURE: 163 MMHG | TEMPERATURE: 96.9 F | DIASTOLIC BLOOD PRESSURE: 91 MMHG

## 2023-03-18 DIAGNOSIS — J44.1 COPD EXACERBATION (H): ICD-10-CM

## 2023-03-18 LAB
FLUAV RNA SPEC QL NAA+PROBE: NEGATIVE
FLUBV RNA RESP QL NAA+PROBE: NEGATIVE
RSV RNA SPEC NAA+PROBE: NEGATIVE
SARS-COV-2 RNA RESP QL NAA+PROBE: NEGATIVE

## 2023-03-18 PROCEDURE — 999N000157 HC STATISTIC RCP TIME EA 10 MIN

## 2023-03-18 PROCEDURE — 250N000009 HC RX 250: Performed by: PHYSICIAN ASSISTANT

## 2023-03-18 PROCEDURE — G0463 HOSPITAL OUTPT CLINIC VISIT: HCPCS | Mod: 25

## 2023-03-18 PROCEDURE — 99213 OFFICE O/P EST LOW 20 MIN: CPT | Mod: CS | Performed by: PHYSICIAN ASSISTANT

## 2023-03-18 PROCEDURE — 87637 SARSCOV2&INF A&B&RSV AMP PRB: CPT | Performed by: PHYSICIAN ASSISTANT

## 2023-03-18 PROCEDURE — C9803 HOPD COVID-19 SPEC COLLECT: HCPCS

## 2023-03-18 PROCEDURE — 94640 AIRWAY INHALATION TREATMENT: CPT

## 2023-03-18 RX ORDER — ALBUTEROL SULFATE 0.83 MG/ML
2.5 SOLUTION RESPIRATORY (INHALATION)
Status: DISCONTINUED | OUTPATIENT
Start: 2023-03-18 | End: 2023-03-18 | Stop reason: HOSPADM

## 2023-03-18 RX ORDER — PREDNISONE 20 MG/1
TABLET ORAL
Qty: 10 TABLET | Refills: 0 | Status: SHIPPED | OUTPATIENT
Start: 2023-03-18 | End: 2023-03-30

## 2023-03-18 RX ORDER — ALBUTEROL SULFATE 90 UG/1
2 AEROSOL, METERED RESPIRATORY (INHALATION) EVERY 4 HOURS PRN
Qty: 18 G | Refills: 0 | Status: SHIPPED | OUTPATIENT
Start: 2023-03-18

## 2023-03-18 RX ADMIN — ALBUTEROL SULFATE 2.5 MG: 2.5 SOLUTION RESPIRATORY (INHALATION) at 09:30

## 2023-03-18 NOTE — DISCHARGE INSTRUCTIONS
Start Augmentin, 1 tab twice daily for 10 days.  Take with food to prevent stomach upset.    Prednisone, 2 tabs daily x5 days.    Albuterol inhaler, 2 puffs every 4 hours as needed for shortness of breath, wheezing, cough.    Recommend Mucinex, without the D component, for congestion.    Push fluids, rest.    We will notify you of your lab results when available.    Follow-up with your primary provider in 2 days for recheck.    If you feel you are worsening, please return to the emergency department or urgent care for reevaluation.   Verbal given to Cordelia Ogden, pharmacist with [facility], to dispense refill on Vitamin D.

## 2023-03-18 NOTE — ED PROVIDER NOTES
History     Chief Complaint   Patient presents with     Nasal Congestion     HPI  Pollo Garrett is a 62 year old male who presents to the urgent care today for evaluation of upper respiratory symptoms.  Onset of symptoms 6 days ago, felt he had improved 2 days later went back to work.  The next day, he began to feel that he was worsening again.  Patient endorses nasal congestion, productive cough, shortness of breath since that time.  Associated fatigue.  Denies wheezing, body aches, nausea/vomiting, fever, chills, sweats, ill contacts or other concerns.  Symptoms are aggravating when laying down; no alleviating factors.  Has been using OTC treatments without significant relief.  Current smoker.     Allergies:  Allergies   Allergen Reactions     Homeopathic Products [Cold-Eeze]      Other reaction(s): Runny Nose       Problem List:    Patient Active Problem List    Diagnosis Date Noted     Mixed hyperlipidemia 01/10/2022     Priority: Medium     Anxiety state 06/09/2018     Priority: Medium     Tobacco use disorder 06/09/2018     Priority: Medium     Acute pain of left knee 06/05/2018     Priority: Medium     Arthritis of hand 03/20/2017     Priority: Medium     Osteoarthrosis, forearm 09/29/2014     Priority: Medium        Past Medical History:    Past Medical History:   Diagnosis Date     Depressive disorder        Past Surgical History:    Past Surgical History:   Procedure Laterality Date     HERNIA REPAIR         Family History:    Family History   Problem Relation Age of Onset     Cerebrovascular Disease Father      Diabetes Brother      Coronary Artery Disease Brother         46       Social History:  Marital Status:   [2]  Social History     Tobacco Use     Smoking status: Every Day     Packs/day: 0.75     Years: 43.00     Pack years: 32.25     Types: Cigarettes     Start date: 1978     Smokeless tobacco: Never   Substance Use Topics     Alcohol use: Yes     Comment: beer - occasionnaly         Medications:    celecoxib (CELEBREX) 100 MG capsule  naproxen sodium 220 MG capsule  rosuvastatin (CRESTOR) 10 MG tablet  albuterol (PROAIR HFA/PROVENTIL HFA/VENTOLIN HFA) 108 (90 Base) MCG/ACT inhaler  amoxicillin-clavulanate (AUGMENTIN) 875-125 MG tablet  predniSONE (DELTASONE) 20 MG tablet          Review of Systems   All other systems reviewed and are negative.      Physical Exam   BP: 163/91  Pulse: 65  Temp: 96.9  F (36.1  C)  Resp: 18  SpO2: 98 %      Physical Exam  Vitals and nursing note reviewed.   Constitutional:       General: He is not in acute distress.     Appearance: Normal appearance. He is well-developed. He is not ill-appearing.   HENT:      Head: Normocephalic and atraumatic.      Right Ear: Tympanic membrane and ear canal normal. Tympanic membrane is not erythematous.      Left Ear: Tympanic membrane and ear canal normal. Tympanic membrane is not erythematous.      Nose: Nose normal.      Mouth/Throat:      Mouth: Mucous membranes are moist.      Pharynx: Uvula midline. No oropharyngeal exudate or posterior oropharyngeal erythema.      Tonsils: No tonsillar exudate.   Eyes:      General:         Right eye: No discharge.         Left eye: No discharge.      Conjunctiva/sclera: Conjunctivae normal.   Cardiovascular:      Rate and Rhythm: Normal rate and regular rhythm.      Heart sounds: Normal heart sounds. No murmur heard.  Pulmonary:      Effort: Pulmonary effort is normal.      Breath sounds: Wheezing (expiratory, left lung base) present. No rhonchi or rales.   Musculoskeletal:      Cervical back: Normal range of motion.   Lymphadenopathy:      Cervical: No cervical adenopathy.   Skin:     General: Skin is warm and dry.      Findings: No rash.   Neurological:      General: No focal deficit present.      Mental Status: He is alert.   Psychiatric:         Mood and Affect: Mood normal.         Behavior: Behavior normal.         ED Course                                   No results found for  this or any previous visit (from the past 24 hour(s)).    Medications   albuterol (PROVENTIL) neb solution 2.5 mg (2.5 mg Nebulization $Given 3/18/23 7268)   Albuterol neb treatment helpful for patient symptoms.     Assessments & Plan (with Medical Decision Making)   Viral upper respiratory infection versus COPD exacerbation.  Patient meets criteria for treatment for COPD exacerbation due to increased productive cough, wheezing, and shortness of breath.  Plan to treat patient with Augmentin, Prednisone, and Albuterol.  COVID/influenza/RSV testing pending and patient will be notified of results when available.      Advised to follow-up with his primary provider in 2 to 3 days for recheck.  Return to the emergency department with any new or worsening symptoms.  Medication dosing, side effects, and return criteria were reviewed with patient in detail and he verbalized understanding.  He is in agreement the plan.    I have reviewed the nursing notes.    I have reviewed the findings, diagnosis, plan and need for follow up with the patient.          Medical Decision Making  The patient's presentation was of low complexity (an acute and uncomplicated illness or injury).    The patient's evaluation involved:  ordering and/or review of 1 test(s) in this encounter (see separate area of note for details)    The patient's management necessitated moderate risk (prescription drug management including medications given in the ED).        New Prescriptions    ALBUTEROL (PROAIR HFA/PROVENTIL HFA/VENTOLIN HFA) 108 (90 BASE) MCG/ACT INHALER    Inhale 2 puffs into the lungs every 4 hours as needed for shortness of breath, wheezing or cough    AMOXICILLIN-CLAVULANATE (AUGMENTIN) 875-125 MG TABLET    Take 1 tablet by mouth 2 times daily for 10 days    PREDNISONE (DELTASONE) 20 MG TABLET    Take two tablets (= 40mg) each day for 5 (five) days       Final diagnoses:   COPD exacerbation (H)       3/18/2023   [facility]      Cuca Valdes PA-C  03/18/23 0950

## 2023-03-18 NOTE — ED PROVIDER NOTES
Notified per telephone of negative Multiplex nasopharyngeal test results     Lee Ann Angeles, CNP  03/18/23 9155

## 2023-03-18 NOTE — ED TRIAGE NOTES
C/o SOB and runny nose since Monday. States the SOB feels like when he had pneumonia 2 years ago and he was told he is more prone to pneumonia since he has had it previously. Denies fevers or coughs. Respirations even and non-labored. SpO2 98% on RA. Sniffling frequently in triage.      Triage Assessment     Row Name 03/18/23 0903       Triage Assessment (Adult)    Airway WDL WDL       Respiratory WDL    Respiratory WDL rhythm/pattern;X    Rhythm/Pattern, Respiratory pattern regular;depth regular;unlabored;shortness of breath

## 2023-03-18 NOTE — ED TRIAGE NOTES
Pt presents with c./o nasal congestion  States that he gets SOB, coughing and nasal congestion/drainage when he lays down at night.   States that it feels like he has pneumonia again  Denies any fever, headache, ear pain/pressure, nvd  Sx started on Monday     Took ibu

## 2023-03-20 ENCOUNTER — TELEPHONE (OUTPATIENT)
Dept: FAMILY MEDICINE | Facility: OTHER | Age: 63
End: 2023-03-20

## 2023-03-20 NOTE — PROGRESS NOTES
"  Assessment & Plan     Upper respiratory tract infection, unspecified type  Seen in UC (3/18/2023) Dx COPD exac taking Augmentin and Prednisone. His symptoms are improving. Denies SOB. No increased work of breathing. Continue with plan of care.              MED REC REQUIRED  Post Medication Reconciliation Status: discharge medications reconciled, continue medications without change      No follow-ups on file.     Blanka Banegas NP student   [facility]     Blank Dunham, DILIA [facility] [place]    Pat Abad is a 62 year old, presenting for the following health issues:  ER F/U (COPD)      HPI     ED/UC Followup:    [place]  [facility] [place] of visit: 3/18/23  Reason for visit: Viral URI vs COPD exacerbation  Treatment with albuterol inhaler, Augmentin and prednisone  Current Status: He reports he feels better. Less coughing. \"I can breathe again.\"   Snoring worsening.   Denies SOB.   Using inhaler every four hours.   Taking antibiotic and prednisone as prescribed.   Eating and drinking okay.   Went to work yesterday and it went well.       Review of Systems   CONSTITUTIONAL: NEGATIVE for fever, chills, change in weight  ENT/MOUTH: NEGATIVE for ear, mouth and throat problems  RESP: NEGATIVE for significant cough or SOB  RESP:POSITIVE for cough-productive, cough-productive, dyspnea on exertion and smoking  CV: NEGATIVE for chest pain, palpitations or peripheral edema  MUSCULOSKELETAL: NEGATIVE for significant arthralgias or myalgia  ROS otherwise negative      Objective    /70   Pulse 78   Temp 97  F (36.1  C) (Tympanic)   Wt 74.4 kg (164 lb)   SpO2 97%   There is no height or weight on file to calculate BMI.     Physical Exam   GENERAL: healthy, alert and no distress  NECK: no adenopathy, no asymmetry, masses, or scars and thyroid normal to palpation  RESP: lungs clear to auscultation - no rales, rhonchi or wheezes  CV: regular rate and rhythm, normal S1 S2, " no S3 or S4, no murmur, click or rub, no peripheral edema and peripheral pulses strong  MS: no gross musculoskeletal defects noted, no edema  SKIN: no suspicious lesions or rashes  PSYCH: mentation appears normal, affect normal/bright

## 2023-03-21 ENCOUNTER — OFFICE VISIT (OUTPATIENT)
Dept: FAMILY MEDICINE | Facility: OTHER | Age: 63
End: 2023-03-21
Attending: NURSE PRACTITIONER
Payer: COMMERCIAL

## 2023-03-21 VITALS
WEIGHT: 164 LBS | HEART RATE: 78 BPM | OXYGEN SATURATION: 97 % | DIASTOLIC BLOOD PRESSURE: 70 MMHG | SYSTOLIC BLOOD PRESSURE: 138 MMHG | TEMPERATURE: 97 F

## 2023-03-21 DIAGNOSIS — J06.9 UPPER RESPIRATORY TRACT INFECTION, UNSPECIFIED TYPE: Primary | ICD-10-CM

## 2023-03-21 PROCEDURE — 99213 OFFICE O/P EST LOW 20 MIN: CPT | Performed by: NURSE PRACTITIONER

## 2023-03-21 NOTE — PATIENT INSTRUCTIONS
Insure adequate fluid intake  Get plenty of rest  Monitor for temp at home, treat with OTC Tylenol or Ibuprofen per package instruction.  Please return in you do not improve  To UC or ER with persistent, worsening, or concerning symptoms    Try breathe right strips. Try humidifier in the bedroom. Use in bedroom.     Discussed sleep study with pt for in the future.

## 2023-03-24 ENCOUNTER — APPOINTMENT (OUTPATIENT)
Dept: CT IMAGING | Facility: HOSPITAL | Age: 63
End: 2023-03-24
Attending: NURSE PRACTITIONER
Payer: COMMERCIAL

## 2023-03-24 ENCOUNTER — APPOINTMENT (OUTPATIENT)
Dept: GENERAL RADIOLOGY | Facility: HOSPITAL | Age: 63
End: 2023-03-24
Attending: NURSE PRACTITIONER
Payer: COMMERCIAL

## 2023-03-24 ENCOUNTER — HOSPITAL ENCOUNTER (EMERGENCY)
Facility: HOSPITAL | Age: 63
Discharge: HOME | End: 2023-03-24
Attending: NURSE PRACTITIONER | Admitting: NURSE PRACTITIONER
Payer: COMMERCIAL

## 2023-03-24 VITALS
OXYGEN SATURATION: 97 % | DIASTOLIC BLOOD PRESSURE: 91 MMHG | TEMPERATURE: 97.4 F | SYSTOLIC BLOOD PRESSURE: 173 MMHG | RESPIRATION RATE: 16 BRPM | HEART RATE: 58 BPM

## 2023-03-24 DIAGNOSIS — K29.00 ACUTE GASTRITIS WITHOUT HEMORRHAGE, UNSPECIFIED GASTRITIS TYPE: ICD-10-CM

## 2023-03-24 LAB
ALBUMIN SERPL BCG-MCNC: 4 G/DL (ref 3.5–5.2)
ALP SERPL-CCNC: 67 U/L (ref 40–129)
ALT SERPL W P-5'-P-CCNC: 22 U/L (ref 10–50)
ANION GAP SERPL CALCULATED.3IONS-SCNC: 16 MMOL/L (ref 7–15)
AST SERPL W P-5'-P-CCNC: 28 U/L (ref 10–50)
BASOPHILS # BLD AUTO: 0 10E3/UL (ref 0–0.2)
BASOPHILS NFR BLD AUTO: 0 %
BILIRUB SERPL-MCNC: 1 MG/DL
BUN SERPL-MCNC: 28.3 MG/DL (ref 8–23)
CALCIUM SERPL-MCNC: 10 MG/DL (ref 8.8–10.2)
CHLORIDE SERPL-SCNC: 98 MMOL/L (ref 98–107)
CREAT SERPL-MCNC: 0.86 MG/DL (ref 0.67–1.17)
D DIMER PPP FEU-MCNC: 2.65 UG/ML FEU (ref 0–0.5)
DEPRECATED HCO3 PLAS-SCNC: 24 MMOL/L (ref 22–29)
EOSINOPHIL # BLD AUTO: 0.3 10E3/UL (ref 0–0.7)
EOSINOPHIL NFR BLD AUTO: 3 %
ERYTHROCYTE [DISTWIDTH] IN BLOOD BY AUTOMATED COUNT: 13 % (ref 10–15)
GFR SERPL CREATININE-BSD FRML MDRD: >90 ML/MIN/1.73M2
GLUCOSE SERPL-MCNC: 115 MG/DL (ref 70–99)
HCT VFR BLD AUTO: 46.6 % (ref 40–53)
HGB BLD-MCNC: 15.8 G/DL (ref 13.3–17.7)
HOLD SPECIMEN: NORMAL
HOLD SPECIMEN: NORMAL
IMM GRANULOCYTES # BLD: 0 10E3/UL
IMM GRANULOCYTES NFR BLD: 0 %
LYMPHOCYTES # BLD AUTO: 0.9 10E3/UL (ref 0.8–5.3)
LYMPHOCYTES NFR BLD AUTO: 9 %
MAGNESIUM SERPL-MCNC: 2.1 MG/DL (ref 1.7–2.3)
MCH RBC QN AUTO: 32.5 PG (ref 26.5–33)
MCHC RBC AUTO-ENTMCNC: 33.9 G/DL (ref 31.5–36.5)
MCV RBC AUTO: 96 FL (ref 78–100)
MONOCYTES # BLD AUTO: 0.4 10E3/UL (ref 0–1.3)
MONOCYTES NFR BLD AUTO: 4 %
NEUTROPHILS # BLD AUTO: 8.9 10E3/UL (ref 1.6–8.3)
NEUTROPHILS NFR BLD AUTO: 84 %
NRBC # BLD AUTO: 0 10E3/UL
NRBC BLD AUTO-RTO: 0 /100
PLATELET # BLD AUTO: 236 10E3/UL (ref 150–450)
POTASSIUM SERPL-SCNC: 4.8 MMOL/L (ref 3.4–5.3)
PROT SERPL-MCNC: 7.1 G/DL (ref 6.4–8.3)
RBC # BLD AUTO: 4.86 10E6/UL (ref 4.4–5.9)
SODIUM SERPL-SCNC: 138 MMOL/L (ref 136–145)
TROPONIN T SERPL HS-MCNC: <6 NG/L
WBC # BLD AUTO: 10.5 10E3/UL (ref 4–11)

## 2023-03-24 PROCEDURE — 250N000009 HC RX 250: Performed by: NURSE PRACTITIONER

## 2023-03-24 PROCEDURE — 96374 THER/PROPH/DIAG INJ IV PUSH: CPT

## 2023-03-24 PROCEDURE — 85379 FIBRIN DEGRADATION QUANT: CPT | Performed by: NURSE PRACTITIONER

## 2023-03-24 PROCEDURE — 85025 COMPLETE CBC W/AUTO DIFF WBC: CPT | Performed by: NURSE PRACTITIONER

## 2023-03-24 PROCEDURE — 96375 TX/PRO/DX INJ NEW DRUG ADDON: CPT

## 2023-03-24 PROCEDURE — 83735 ASSAY OF MAGNESIUM: CPT | Performed by: NURSE PRACTITIONER

## 2023-03-24 PROCEDURE — 250N000011 HC RX IP 250 OP 636: Performed by: NURSE PRACTITIONER

## 2023-03-24 PROCEDURE — C9113 INJ PANTOPRAZOLE SODIUM, VIA: HCPCS | Performed by: NURSE PRACTITIONER

## 2023-03-24 PROCEDURE — 36415 COLL VENOUS BLD VENIPUNCTURE: CPT | Performed by: NURSE PRACTITIONER

## 2023-03-24 PROCEDURE — 99284 EMERGENCY DEPT VISIT MOD MDM: CPT | Performed by: NURSE PRACTITIONER

## 2023-03-24 PROCEDURE — 84484 ASSAY OF TROPONIN QUANT: CPT | Performed by: NURSE PRACTITIONER

## 2023-03-24 PROCEDURE — 93005 ELECTROCARDIOGRAM TRACING: CPT

## 2023-03-24 PROCEDURE — 71046 X-RAY EXAM CHEST 2 VIEWS: CPT

## 2023-03-24 PROCEDURE — 99285 EMERGENCY DEPT VISIT HI MDM: CPT | Mod: 25

## 2023-03-24 PROCEDURE — 250N000013 HC RX MED GY IP 250 OP 250 PS 637: Performed by: NURSE PRACTITIONER

## 2023-03-24 PROCEDURE — 71275 CT ANGIOGRAPHY CHEST: CPT

## 2023-03-24 PROCEDURE — 93010 ELECTROCARDIOGRAM REPORT: CPT | Performed by: INTERNAL MEDICINE

## 2023-03-24 PROCEDURE — 80053 COMPREHEN METABOLIC PANEL: CPT | Performed by: NURSE PRACTITIONER

## 2023-03-24 RX ORDER — FAMOTIDINE 20 MG/1
20 TABLET, FILM COATED ORAL 2 TIMES DAILY
Qty: 14 TABLET | Refills: 0 | Status: SHIPPED | OUTPATIENT
Start: 2023-03-24 | End: 2023-03-31

## 2023-03-24 RX ORDER — IOPAMIDOL 755 MG/ML
58 INJECTION, SOLUTION INTRAVASCULAR ONCE
Status: COMPLETED | OUTPATIENT
Start: 2023-03-24 | End: 2023-03-24

## 2023-03-24 RX ADMIN — IOPAMIDOL 58 ML: 755 INJECTION, SOLUTION INTRAVENOUS at 14:12

## 2023-03-24 RX ADMIN — FAMOTIDINE 40 MG: 10 INJECTION, SOLUTION INTRAVENOUS at 14:45

## 2023-03-24 RX ADMIN — PANTOPRAZOLE SODIUM 80 MG: 40 INJECTION, POWDER, FOR SOLUTION INTRAVENOUS at 14:46

## 2023-03-24 RX ADMIN — LIDOCAINE HYDROCHLORIDE 30 ML: 20 SOLUTION ORAL; TOPICAL at 12:43

## 2023-03-24 ASSESSMENT — ACTIVITIES OF DAILY LIVING (ADL)
ADLS_ACUITY_SCORE: 35
ADLS_ACUITY_SCORE: 35

## 2023-03-24 NOTE — ED PROVIDER NOTES
EMERGENCY MEDICINE NOTE - ANA DOBBS, CNP    ID:   Pollo Garrett    CC:  Chief Complaint   Patient presents with     Chest Pain     Shortness of Breath        MEANS OF ARRIVAL:  ambulance    PCP:   Kasia Diggs    SUBJECTIVE:   HPI:   Pollo Garrett is a 62 year old individual with history of anxiety, hyperlipidemia, comes in today for mid-sternal chest pain that started at 0530 this morning.   Patient states that the pain was a 7/10 and points to the area of the mid sternum and xiphoid process.  Denies radiation of pain into the back, neck, arm.  States he does have occasional shortness of breath with this.  States he has had 5 episodes of vomiting and 4 episodes of loose stools this morning with this.  Denies fever, chills, dizziness, lightheadedness, blood in emesis, melena, hematochezia.  Was given aspirin 324 via EMS and nitro with minimal improvement.   Upon arrival patient states pain is down to 3/10 but continues to have this.    Review of Systems:  Significant positives/negatives were reviewed and mentioned in HPI.  All remaining body systems are negative or non-contributory.    I have reviewed the PMH, Meds, Allergies, and SH, including:  ALLERGIES:  Allergies   Allergen Reactions     Homeopathic Products [Cold-Eeze]      Other reaction(s): Runny Nose       CURRENT MEDICATIONS:  Current Outpatient Rx   Medication Sig Dispense Refill     albuterol (PROAIR HFA/PROVENTIL HFA/VENTOLIN HFA) 108 (90 Base) MCG/ACT inhaler Inhale 2 puffs into the lungs every 4 hours as needed for shortness of breath, wheezing or cough 18 g 0     amoxicillin-clavulanate (AUGMENTIN) 875-125 MG tablet Take 1 tablet by mouth 2 times daily for 10 days 20 tablet 0     celecoxib (CELEBREX) 100 MG capsule Take 1 capsule (100 mg) by mouth 2 times daily (Patient not taking: Reported on 3/21/2023) 60 capsule 1     naproxen sodium 220 MG capsule Take 220 mg by mouth 2 times daily (with meals) (Patient not taking:  Reported on 3/21/2023)       predniSONE (DELTASONE) 20 MG tablet Take two tablets (= 40mg) each day for 5 (five) days 10 tablet 0     rosuvastatin (CRESTOR) 10 MG tablet Take 1 tablet (10 mg) by mouth daily (Patient not taking: Reported on 3/21/2023) 90 tablet 1        PMH:  Patient Active Problem List   Diagnosis     Acute pain of left knee     Anxiety state     Arthritis of hand     Osteoarthrosis, forearm     Tobacco use disorder     Mixed hyperlipidemia     Past Surgical History:   Procedure Laterality Date     HERNIA REPAIR       Social History     Tobacco Use     Smoking status: Every Day     Packs/day: 0.75     Years: 43.00     Pack years: 32.25     Types: Cigarettes     Start date: 1978     Smokeless tobacco: Never   Substance Use Topics     Alcohol use: Yes     Comment: beer - occasionnaly       OBJECTIVE:     Vitals:    03/24/23 1226 03/24/23 1245 03/24/23 1430   BP: 175/94 152/81 (!) 186/97   Pulse: 55 55 59   Resp: 16     Temp: 97.3  F (36.3  C)     TempSrc: Oral     SpO2: 99% 94% 98%     There is no height or weight on file to calculate BMI.  GENERAL APPEARANCE:  The patient is a 62 year old well-developed, well-nourished individual in no acute distress that appears as stated age.  NECK:  Supple.  Trachea is midline.  No carotid bruits present on auscultation.  CHEST:  Symmetric.  Non-tender to palpation.  No crepitus or deformity.  LUNGS:  Breathing is easy.  Breath sounds are equal and clear bilaterally.  No wheezes, rhonchi, or rales.  HEART:  Regular rate and rhythm with normal S1 and S2.  No murmurs, gallops, or rubs.  ABDOMEN:  No abdominal bruits or thrills present upon auscultation/palpation.  EXTREMITIES:  No cyanosis, clubbing, or edema.   NEUROLOGIC:  No focal sensory or motor deficits are noted.    PSYCHIATRIC:  The patient is awake, alert, and oriented x4.  Recent and remote memory is intact.  Appropriate mood and affect.  Calm and cooperative with history and physical exam.  SKIN:  Warm,  dry, and well perfused.  Good turgor.  No lesions, nodules, or rashes are noted.  No bruising noted.     Comment: Discrepancies between my note and notes on behalf of the nursing team or other care providers are secondary to my findings reflecting my physical examination and questioning of the patient.  Any conflicting information provided is not in line with my examination of the patient.    ECG:    ECG competed at 1226 and personally reviewed at 1234 showing sinus bradycardia with ventricular rate in the 50's.  Normal axis.  Normal ECG.  No previous ECG available for comparison.    LAB:     Recent Results (from the past 24 hour(s))   D dimer quantitative    Collection Time: 03/24/23 12:31 PM   Result Value Ref Range    D-Dimer Quantitative 2.65 (H) 0.00 - 0.50 ug/mL FEU   Comprehensive metabolic panel    Collection Time: 03/24/23 12:31 PM   Result Value Ref Range    Sodium 138 136 - 145 mmol/L    Potassium 4.8 3.4 - 5.3 mmol/L    Chloride 98 98 - 107 mmol/L    Carbon Dioxide (CO2) 24 22 - 29 mmol/L    Anion Gap 16 (H) 7 - 15 mmol/L    Urea Nitrogen 28.3 (H) 8.0 - 23.0 mg/dL    Creatinine 0.86 0.67 - 1.17 mg/dL    Calcium 10.0 8.8 - 10.2 mg/dL    Glucose 115 (H) 70 - 99 mg/dL    Alkaline Phosphatase 67 40 - 129 U/L    AST 28 10 - 50 U/L    ALT 22 10 - 50 U/L    Protein Total 7.1 6.4 - 8.3 g/dL    Albumin 4.0 3.5 - 5.2 g/dL    Bilirubin Total 1.0 <=1.2 mg/dL    GFR Estimate >90 >60 mL/min/1.73m2   Troponin T, High Sensitivity    Collection Time: 03/24/23 12:31 PM   Result Value Ref Range    Troponin T, High Sensitivity <6 <=22 ng/L   Magnesium    Collection Time: 03/24/23 12:31 PM   Result Value Ref Range    Magnesium 2.1 1.7 - 2.3 mg/dL   CBC with platelets and differential    Collection Time: 03/24/23 12:31 PM   Result Value Ref Range    WBC Count 10.5 4.0 - 11.0 10e3/uL    RBC Count 4.86 4.40 - 5.90 10e6/uL    Hemoglobin 15.8 13.3 - 17.7 g/dL    Hematocrit 46.6 40.0 - 53.0 %    MCV 96 78 - 100 fL    MCH 32.5 26.5  - 33.0 pg    MCHC 33.9 31.5 - 36.5 g/dL    RDW 13.0 10.0 - 15.0 %    Platelet Count 236 150 - 450 10e3/uL    % Neutrophils 84 %    % Lymphocytes 9 %    % Monocytes 4 %    % Eosinophils 3 %    % Basophils 0 %    % Immature Granulocytes 0 %    NRBCs per 100 WBC 0 <1 /100    Absolute Neutrophils 8.9 (H) 1.6 - 8.3 10e3/uL    Absolute Lymphocytes 0.9 0.8 - 5.3 10e3/uL    Absolute Monocytes 0.4 0.0 - 1.3 10e3/uL    Absolute Eosinophils 0.3 0.0 - 0.7 10e3/uL    Absolute Basophils 0.0 0.0 - 0.2 10e3/uL    Absolute Immature Granulocytes 0.0 <=0.4 10e3/uL    Absolute NRBCs 0.0 10e3/uL       IMAGING STUDIES:     Results for orders placed or performed during the hospital encounter of 03/24/23   XR Chest 2 Views    Narrative    PROCEDURE: XR CHEST 2 VIEWS 3/24/2023 1:09 PM    HISTORY: Chest pain    COMPARISONS: 1/14/2022.    TECHNIQUE: 2 views.    FINDINGS: Heart and pulmonary vasculature are normal. Lungs are clear  and no pleural effusion is seen.         Impression    IMPRESSION: No acute disease.    BRITNEY BRANNON MD         SYSTEM ID:  [id number]   CTA Chest with Contrast    Narrative    EXAM: CTA CHEST WITH CONTRAST, 3/24/2023 2:19 PM    COMPARISON: Chest radiograph 3/24/2023; CT chest 1/14/2022    HISTORY: Chest pain    TECHNIQUE:   Imaging protocol: Multiplanar CT images of the chest after  administration of intravenous contrast. Meds given: 58 mL of  Isovue-370.  Acquisition: This CT exam was performed using one or more the  following dose reduction techniques: automated exposure control,  adjustment of the mA and/or kV according to patient size, and/or  iterative reconstruction technique.  Processing: 3D rendering on independent workstation using Maximum  Intensity Projection (MIP) was performed and archived to PACS. 3D  reconstructions are interpreted and reported by supervising  radiologist.    FINDINGS:     CHEST:    VESSELS AND HEART:  There is adequate contrast bolus in the study. Contrast bolus  adequately  opacifies the pulmonary arteries. No acute pulmonary emboli  to the subsegmental level. Aorta is within normal limits. No  cardiomegaly. No significant pericardial effusion.    AIRWAYS: Within normal limits  LUNG: No suspicious pulmonary nodules. Scattered discoid and  subpleural atelectasis. No acute airspace opacities.  PLEURA: Within normal limits.  LYMPH NODES: No enlarged lymph nodes.  THYROID: Within normal limits.    BONES AND SOFT TISSUES:  No suspicious osseous lesions. Degenerative periarticular changes and  spinal spondylosis.    UPPER ABDOMEN:  Limited evaluation of the upper abdomen demonstrates marked mucosal  thickening of the gastric antrum with surrounding stranding and edema.  Nonobstructive bowel gas pattern. Calcified precaval lymph node.  Biliary tree is nondilated. No gallbladder mucosal thickening. No free  air. Radiodense tablet in the stomach.      Impression    IMPRESSION:   1.  No acute pulmonary emboli to the subsegmental level.  2.  Marked inflammatory changes surrounding the gastric antrum,  concerning for gastritis, favored sequela of PUD, less likely  infiltrative lymphoproliferative disease. Recommend follow-up upper  endoscopy.    [Result: Impression #1 and #2]    Provider Jaxson DOBBS CNP discussed results over the phone with  Dr. Dhillon at 3/24/2023 2:29 PM and verbalized understanding of the  result.     SERENA DHILLON MD         SYSTEM ID:  [id number]     ED COURSE/ MDM/ ASSESSMENT/ PLAN:   Diagnostic Testing:  Diagnostic testing was conducted.  Labs show WBC of 10.5 with hemoglobin of 15.8.  Electrolytes benign.  BUN 28.3 with a creatinine of 0.86 and GFR greater than 90.  Troponin negative.  D-dimer 2.65..  The Plain Film X-rays have been read by the radiologist and personally reviewed with the interpretation of no cardiopulmonary abnormalities.    The CT has been read by the radiologist with the interpretation of no PE present.  Does have marked inflammatory changes  surrounding the gastric antrum concerning for gastritis favoring sequelae of PUD unless likely infiltrative lymphoproliferative.  Recommend EGD.    Assessment:   Patient presents to the ER today chest pain.  Upon arrival, vitals signs show blood pressure 175/94 with a pulse of 55.  Temperature 36.3  C.  Respirations 16 with oxygenation of 99% on room air.  Examination was completed.  The patient is alert and oriented no distress.  Physical examination essentially normal at this time.    Potential diagnosis which have been considered and evaluated include MI/NSTEMI, PE, thoracic aneurysm, gastroenteritis, costochondritis, gastric ulcer, as well as others. Many of these have been excluded using the various modalities and assessment as noted on the chart. At the present time, the diagnosis given seems to be the most likely gastritis.    ED Course/MDM/Plan:   Patient comes in with complaints of nausea and vomiting with mid-sternal chest pain that started at 0530 this morning.  Pain is starting to improve prior to arrival.  Patient was medicated with aspirin and nitro via EMS.  Patient alert and oriented upon arrival.  Does have elevated blood pressure upon arrival which improved by self without any treatment.  Physical examination shows no acute abnormalities.  ECG shows no abnormalities and troponin negative making ACS unlikely.  No leukocytosis or anemia present.  Electrolytes and renal functions benign.  Chest x-ray shows no acute abnormalities.  Patient having mid-sternal chest pain that is not on radiating and unchanged with palpation.  Patient given GI cocktail with improvement.  D-dimer was conducted and was elevated at 2.65.  CT angio of the chest was conducted showing no PE but does have marked gastric antrum inflammatory changes concerning for peptic ulcer disease.  Patient given famotidine 40 mg IV in addition to pantoprazole 80 mg IV for this reason.  All other abnormalities were ruled out.  Patient most  likely has gastritis which could be related to PUD.  Will discharge patient on famotidine 20 mg twice daily x7 days in addition to omeprazole 20 mg twice daily to continue until followed with by surgery.  Referral sent for surgery.  Advised patient to do clear liquid diet for the next 24 hours and advance to a bland diet.  Advised patient to follow-up with PCP.  Return to ER if any new or worsening symptoms.  Patient verbalized understanding agrees with plan of care.  Patient discharged home.      DIAGNOSIS:   (K29.00) Acute gastritis without hemorrhage, unspecified gastritis type        Critical Care Time: None    Impression and plan discussed with patient. Questions answered, concerns addressed, indications for urgent re-evaluation reviewed, and  given. Patient/Parent/Caregiver agree with treatment plan and have no further questions at this time.  AVS provided at discharge.    This note was created by the Dragon Voice Dictation System. Inadvertent typographical errors, due to software recognition problems, may still exist.      Jaxson DOBBS, CNP  [facility]  EMERGENCY DEPARTMENT  [address] [place] [id number]  [phone]       Jaxson Heaton APRN CNP  03/24/23 8173

## 2023-03-24 NOTE — ED TRIAGE NOTES
"Pt presents with c/o CP and intermittent SOB onset 0530.  Pt states the SOB is intermittent and \"sometimes\" is worse with deep breathing.  Pt reports CP is midsternal and \"sometimes\" goes through his back.  Denies heart history, reports recent dx of COPD. Pt also reporting n/v/d onset today.       Pt received 324 ASA and 1 nitr0 via EMS with no change to symptoms.     "

## 2023-03-24 NOTE — DISCHARGE INSTRUCTIONS
Take famotidine 20 mg twice daily for the next 7 days in addition to omeprazole 20 mg twice daily.  Continue the omeprazole 20 mg twice daily indefinitely until followed up with by surgery.    Eat a bland diet at this time.  Avoid spicy and greasy foods.         Follow-up with your primary care provider for reevaluation.  Contact your primary care provider if you have any questions or concerns.  Do not hesitate to return to the ER if any new or worsening symptoms.     Please read the attached instructions (if any).  They highlight more specific treatments and interventions for you at home.              Thank you for letting me participate in your care and wish you a fast and uneventful recovery,    Jaxson DOBBS, CNP    Do not hesitate to contact me with questions or concerns.  [email or web address]  [email or web address]

## 2023-03-27 ENCOUNTER — TELEPHONE (OUTPATIENT)
Dept: FAMILY MEDICINE | Facility: OTHER | Age: 63
End: 2023-03-27

## 2023-03-27 ENCOUNTER — TELEPHONE (OUTPATIENT)
Dept: SURGERY | Facility: OTHER | Age: 63
End: 2023-03-27

## 2023-03-27 NOTE — TELEPHONE ENCOUNTER
Patient and patient's spouse calling and requesting an appt.    Patient was seen in ER on 3/24 for chest pain and vomiting.     Per ER, patient to follow up in one week.   Patient requesting to be seen Thursday due to being off work for another appt with surgeon.     Please advise thank you.     Please call spouse, Lynn, as patient will be at work.   Verbal consent received from patient to speak with Lynn regarding appt.   Patient will fill out consent to communicate form when he comes for appt.         Emergency Department and Urgent Care Follow-up Visit    Appointment scheduled:  Per ER patient to follow up with PCP in one week.                  OR  Pended to Provider to review & advise for [employer] karina't ?      Reason for ER/UC visit: chest pain, abdominal pain and vomiting    Date seen: 3/24/23    New or Worsening symptoms:  Still having difficulty eating    Prescription Received/Picked up from Pharmacy? Omeprazole and famotidine picked up and started    Follow-up Results or Labs that are pending: No          Recommended ED/UC with any acute/rapid decline in condition.  Call back to the clinic with any further questions/concerns  Patient relayed understanding  No further concerns at calls end.

## 2023-03-27 NOTE — TELEPHONE ENCOUNTER
Call received from patient to schedule consultation appointment. Patient in ER.   Appointment scheduled.

## 2023-03-28 NOTE — PROGRESS NOTES
[facility] - [place]  [address]  [place] [address]  Phone: [phone]  Primary Provider: Kasia Diggs  Pre-op Performing Provider: KASIA DIGGS      PREOPERATIVE EVALUATION:  Today's date: 3/30/2023    Pollo Garrett is a 62 year old male who presents for a preoperative evaluation.  No flowsheet data found.  Surgical Information:  Surgery/Procedure: EGD  Surgery Location: [facility] [place]  Surgeon: Dr. Oglesby  Surgery Date: 04/18/2023  Time of Surgery: TBD  Where patient plans to recover: At home with family  Fax number for surgical facility: Note does not need to be faxed, will be available electronically in Epic.    Assessment & Plan     The proposed surgical procedure is considered LOW risk.    Preop general physical exam  No concerning lab, exam, or ekg findings    Acute gastritis without hemorrhage, unspecified gastritis type  Improving. Pedro has cut down on his coffee, not using NSAIDs, not drinking alcohol   Hgb down to 14.8 from 15.8, but most likely dehydrated in ER  - CBC with platelets; Future  - D dimer quantitative; Future  - Comprehensive metabolic panel (BMP + Alb, Alk Phos, ALT, AST, Total. Bili, TP); Future  - Reflex INR; Future  - Lab Blood Morphology Pathologist Review; Future    Elevated d-dimer  resolved  - Lab Blood Morphology Pathologist Review; Future    Mixed hyperlipidemia  ASCVD risk of 11.5%  - Lipid Profile (Chol, Trig, HDL, LDL calc)  - restart - rosuvastatin (CRESTOR) 10 MG tablet; Take 1 tablet (10 mg) by mouth daily  - recheck one month - Comprehensive metabolic panel (BMP + Alb, Alk Phos, ALT, AST, Total. Bili, TP); Future    Anxiety state  Stable  No medications    Tobacco use disorder  - General PFT Lab (Please always keep checked); Future  - Pulmonary Function Test; Future  - General PFT Lab (Please always keep checked)      Risks and Recommendations:  The patient has the following additional risks and recommendations for perioperative complications:    - No identified additional risk factors other than previously addressed    Medication Instructions:  Patient is to take all scheduled medications on the day of surgery  - Pedro is not taking NSAIDs, ASA      RECOMMENDATION:  Pollo is optimized to proceed with proposed procedure, without further diagnostic evaluation.      Subjective     HPI related to upcoming procedure: Pedro presented to the ER on 3/24/2023 with mid sternal chest pain. CTA chest - negative for PE, positive for gastritis w/ marked inflammation favoring sequelae of PUD. Pedro will be undergoing EGD for further investigation       Preop Questions 3/30/2023   1. Have you ever had a heart attack or stroke? No   2. Have you ever had surgery on your heart or blood vessels, such as a stent placement, a coronary artery bypass, or surgery on an artery in your head, neck, heart, or legs? No   3. Do you have chest pain with activity? No   4. Do you have a history of  heart failure? No   5. Do you currently have a cold, bronchitis or symptoms of other infection? No   6. Do you have a cough, shortness of breath, or wheezing? No   7. Do you or anyone in your family have previous history of blood clots? No   8. Do you or does anyone in your family have a serious bleeding problem such as prolonged bleeding following surgeries or cuts? No   9. Have you ever had problems with anemia or been told to take iron pills? No   10. Have you had any abnormal blood loss such as black, tarry or bloody stools? No   11. Have you ever had a blood transfusion? No   12. Are you willing to have a blood transfusion if it is medically needed before, during, or after your surgery? Yes   13. Have you or any of your relatives ever had problems with anesthesia? No   14. Do you have sleep apnea, excessive snoring or daytime drowsiness? YES - Excessive snoring    14a. Do you have a CPAP machine? No   15. Do you have any artifical heart valves or other implanted medical devices like a  pacemaker, defibrillator, or continuous glucose monitor? No   16. Do you have artificial joints? No   17. Are you allergic to latex? No     Health Care Directive:  Patient does not have a Health Care Directive or Living Will: Discussed advance care planning with patient; however, patient declined at this time.    Preoperative Review of :   reviewed - no record of controlled substances prescribed.      Status of Chronic Conditions:  DEPRESSION - Patient has a long history of Depression of moderate severity requiring medication for control with recent symptoms being ups and down..Current symptoms of depression include depressed mood, anxiety.     HYPERLIPIDEMIA - Patient has a long history of significant Hyperlipidemia requiring medication for treatment with recent fair control. Patient reports not taking crestor in awhile .   LDL (today) 98  - not taking crestor   - can lower risk to 8% if statin and stopping smoking    The 10-year ASCVD risk score (Nova MACDONALD, et al., 2019) is: 11.5%    Values used to calculate the score:      Age: 62 years      Sex: Male      Is Non- : No      Diabetic: No      Tobacco smoker: Yes      Systolic Blood Pressure: 112 mmHg      Is BP treated: No      HDL Cholesterol: 58 mg/dL      Total Cholesterol: 183 mg/dL      Facility:  [facility]  Date of visit: 3/24/2023  Reason for visit: Chest pain, Shortness of breath. EKG sinus justin with rate 50s. D-dimer 2.65. Troponin negative. CXR negative. CTA chest - negative for PE, positive for gastritis w/ marked inflammation favoring sequelae of PUD unless likely infiltrative lymphoproliferative  Current Status: States a lot better, quit alcohol, cut way down on coffee, watching what he eats    - appt with Dr. Oglesby on 3/20/2023 which resulted in EGD scheduled  - decreased coffee to one cup per day. Was drinking two to three pots per day  - no alcohol since ER visit     # Cardiovascular: able to walk up a flight of stairs  w/o cardiac or pulmonary issues    # Pulmonary: every day smoker, working on quitting     # Bleeding/Clotting risk: no personal or family h/o bleeding or clotting issues    # Previous surgical history: no issues with anesthesia      Review of Systems   Constitutional: Negative for chills and fever.   HENT: Negative for congestion.    Respiratory: Negative for shortness of breath and wheezing.    Cardiovascular: Negative for chest pain and palpitations.   Gastrointestinal: Positive for abdominal pain (pain after eating).   Genitourinary: Negative for difficulty urinating.   Psychiatric/Behavioral: Positive for mood changes (ups and down).         Patient Active Problem List    Diagnosis Date Noted     Mixed hyperlipidemia 01/10/2022     Priority: Medium     Anxiety state 06/09/2018     Priority: Medium     Tobacco use disorder 06/09/2018     Priority: Medium     Acute pain of left knee 06/05/2018     Priority: Medium     Arthritis of hand 03/20/2017     Priority: Medium     Osteoarthrosis, forearm 09/29/2014     Priority: Medium      Past Medical History:   Diagnosis Date     Depressive disorder      Past Surgical History:   Procedure Laterality Date     HERNIA REPAIR       Current Outpatient Medications   Medication Sig Dispense Refill     albuterol (PROAIR HFA/PROVENTIL HFA/VENTOLIN HFA) 108 (90 Base) MCG/ACT inhaler Inhale 2 puffs into the lungs every 4 hours as needed for shortness of breath, wheezing or cough 18 g 0     famotidine (PEPCID) 20 MG tablet Take 1 tablet (20 mg) by mouth 2 times daily for 7 days 14 tablet 0     omeprazole (PRILOSEC) 20 MG DR capsule Take 1 capsule (20 mg) by mouth 2 times daily for 30 days 60 capsule 0     celecoxib (CELEBREX) 100 MG capsule Take 1 capsule (100 mg) by mouth 2 times daily (Patient not taking: Reported on 3/21/2023) 60 capsule 1     naproxen sodium 220 MG capsule Take 220 mg by mouth 2 times daily (with meals) (Patient not taking: Reported on 3/21/2023)        predniSONE (DELTASONE) 20 MG tablet Take two tablets (= 40mg) each day for 5 (five) days (Patient not taking: Reported on 3/30/2023) 10 tablet 0     rosuvastatin (CRESTOR) 10 MG tablet Take 1 tablet (10 mg) by mouth daily (Patient not taking: Reported on 3/21/2023) 90 tablet 1       Allergies   Allergen Reactions     Homeopathic Products [Cold-Eeze]      Other reaction(s): Runny Nose        Social History     Tobacco Use     Smoking status: Every Day     Packs/day: 0.50     Years: 43.00     Pack years: 21.50     Types: Cigarettes     Start date: 1978     Smokeless tobacco: Never   Substance Use Topics     Alcohol use: Yes     Comment: beer - occasionnaly     Family History   Problem Relation Age of Onset     Cerebrovascular Disease Father      Diabetes Brother      Coronary Artery Disease Brother         46     History   Drug Use Not on file         Objective     /74 (BP Location: Right arm, Patient Position: Chair, Cuff Size: Adult Regular)   Pulse 79   Temp 97.2  F (36.2  C) (Tympanic)   Resp 18   Wt 82.3 kg (181 lb 6 oz)   SpO2 99%   BMI 27.58 kg/m      Physical Exam  Constitutional:       General: He is not in acute distress.     Appearance: He is well-developed. He is not ill-appearing.   HENT:      Head: Normocephalic and atraumatic.      Right Ear: Tympanic membrane normal.      Left Ear: Tympanic membrane normal.      Mouth/Throat:      Mouth: Mucous membranes are moist.      Pharynx: No oropharyngeal exudate.   Eyes:      Extraocular Movements: Extraocular movements intact.      Conjunctiva/sclera: Conjunctivae normal.   Neck:      Thyroid: No thyromegaly.   Cardiovascular:      Rate and Rhythm: Normal rate and regular rhythm.      Pulses: Normal pulses.      Heart sounds: No murmur heard.  Pulmonary:      Effort: Pulmonary effort is normal. No respiratory distress.      Breath sounds: No wheezing or rales.   Abdominal:      General: Bowel sounds are normal. There is no distension.       Palpations: Abdomen is soft.      Tenderness: There is no abdominal tenderness. There is no guarding.   Musculoskeletal:         General: Normal range of motion.      Cervical back: Normal range of motion and neck supple.      Right lower leg: No edema.      Left lower leg: No edema.   Lymphadenopathy:      Cervical: No cervical adenopathy.   Skin:     General: Skin is warm and dry.   Neurological:      Mental Status: He is alert.      Deep Tendon Reflexes: Reflexes normal.   Psychiatric:         Mood and Affect: Mood normal.           Recent Labs   Lab Test 03/30/23  1034 03/24/23  1231 01/10/22  0837   HGB 14.8 15.8 15.6    236 230   INR 1.06  --   --    NA  --  138 137   POTASSIUM  --  4.8 4.5   CR  --  0.86 0.94        Diagnostics:  Recent Results (from the past 24 hour(s))   Reflex INR    Collection Time: 03/30/23 10:34 AM   Result Value Ref Range    INR 1.06 0.85 - 1.15   Comprehensive metabolic panel (BMP + Alb, Alk Phos, ALT, AST, Total. Bili, TP)    Collection Time: 03/30/23 10:34 AM   Result Value Ref Range    Sodium 141 136 - 145 mmol/L    Potassium 4.4 3.4 - 5.3 mmol/L    Chloride 105 98 - 107 mmol/L    Carbon Dioxide (CO2) 26 22 - 29 mmol/L    Anion Gap 10 7 - 15 mmol/L    Urea Nitrogen 20.7 8.0 - 23.0 mg/dL    Creatinine 1.03 0.67 - 1.17 mg/dL    Calcium 9.8 8.8 - 10.2 mg/dL    Glucose 95 70 - 99 mg/dL    Alkaline Phosphatase 64 40 - 129 U/L    AST 18 10 - 50 U/L    ALT 26 10 - 50 U/L    Protein Total 7.2 6.4 - 8.3 g/dL    Albumin 4.2 3.5 - 5.2 g/dL    Bilirubin Total 0.7 <=1.2 mg/dL    GFR Estimate 81 >60 mL/min/1.73m2   D dimer quantitative    Collection Time: 03/30/23 10:34 AM   Result Value Ref Range    D-Dimer Quantitative 0.48 0.00 - 0.50 ug/mL FEU   CBC with platelets and differential    Collection Time: 03/30/23 10:34 AM   Result Value Ref Range    WBC Count 5.0 4.0 - 11.0 10e3/uL    RBC Count 4.54 4.40 - 5.90 10e6/uL    Hemoglobin 14.8 13.3 - 17.7 g/dL    Hematocrit 44.2 40.0 - 53.0 %     MCV 97 78 - 100 fL    MCH 32.6 26.5 - 33.0 pg    MCHC 33.5 31.5 - 36.5 g/dL    RDW 12.9 10.0 - 15.0 %    Platelet Count 199 150 - 450 10e3/uL    % Neutrophils 55 %    % Lymphocytes 33 %    % Monocytes 6 %    % Eosinophils 5 %    % Basophils 1 %    % Immature Granulocytes 0 %    NRBCs per 100 WBC 0 <1 /100    Absolute Neutrophils 2.7 1.6 - 8.3 10e3/uL    Absolute Lymphocytes 1.6 0.8 - 5.3 10e3/uL    Absolute Monocytes 0.3 0.0 - 1.3 10e3/uL    Absolute Eosinophils 0.3 0.0 - 0.7 10e3/uL    Absolute Basophils 0.1 0.0 - 0.2 10e3/uL    Absolute Immature Granulocytes 0.0 <=0.4 10e3/uL    Absolute NRBCs 0.0 10e3/uL   Reticulocyte count    Collection Time: 03/30/23 10:34 AM   Result Value Ref Range    % Reticulocyte 1.1 0.5 - 2.0 %    Absolute Reticulocyte 0.051 0.025 - 0.095 10e6/uL   Lipid Profile (Chol, Trig, HDL, LDL calc)    Collection Time: 03/30/23 10:34 AM   Result Value Ref Range    Cholesterol 183 <200 mg/dL    Triglycerides 136 <150 mg/dL    Direct Measure HDL 58 >=40 mg/dL    LDL Cholesterol Calculated 98 <=100 mg/dL    Non HDL Cholesterol 125 <130 mg/dL       The 10-year ASCVD risk score (Nova MACDONALD, et al., 2019) is: 11.5%    Values used to calculate the score:      Age: 62 years      Sex: Male      Is Non- : No      Diabetic: No      Tobacco smoker: Yes      Systolic Blood Pressure: 112 mmHg      Is BP treated: No      HDL Cholesterol: 58 mg/dL      Total Cholesterol: 183 mg/dL      EKG (3/24/2023): sinus bradycardia with rate of 55, normal axis, normal intervals, no acute ST/T changes c/w ischemia, no LVH by voltage criteria, there are no prior tracings available    Revised Cardiac Risk Index (RCRI):  The patient has the following serious cardiovascular risks for perioperative complications:   - No serious cardiac risks = 0 points     RCRI Interpretation: 0 points: Class I (very low risk - 0.4% complication rate)           Signed Electronically by: Kasia Diggs MD  Copy of  this evaluation report is provided to requesting physician.

## 2023-03-30 ENCOUNTER — LAB (OUTPATIENT)
Dept: LAB | Facility: OTHER | Age: 63
End: 2023-03-30
Attending: SURGERY
Payer: COMMERCIAL

## 2023-03-30 ENCOUNTER — OFFICE VISIT (OUTPATIENT)
Dept: FAMILY MEDICINE | Facility: OTHER | Age: 63
End: 2023-03-30
Attending: FAMILY MEDICINE
Payer: COMMERCIAL

## 2023-03-30 ENCOUNTER — OFFICE VISIT (OUTPATIENT)
Dept: SURGERY | Facility: OTHER | Age: 63
End: 2023-03-30
Attending: SURGERY
Payer: COMMERCIAL

## 2023-03-30 ENCOUNTER — PREP FOR PROCEDURE (OUTPATIENT)
Dept: SURGERY | Facility: OTHER | Age: 63
End: 2023-03-30

## 2023-03-30 ENCOUNTER — HOSPITAL ENCOUNTER (OUTPATIENT)
Facility: HOSPITAL | Age: 63
End: 2023-03-30
Attending: SURGERY | Admitting: SURGERY
Payer: COMMERCIAL

## 2023-03-30 VITALS
OXYGEN SATURATION: 99 % | WEIGHT: 181.38 LBS | TEMPERATURE: 97.2 F | BODY MASS INDEX: 27.58 KG/M2 | SYSTOLIC BLOOD PRESSURE: 112 MMHG | DIASTOLIC BLOOD PRESSURE: 74 MMHG | HEART RATE: 79 BPM | RESPIRATION RATE: 18 BRPM

## 2023-03-30 VITALS
BODY MASS INDEX: 26.83 KG/M2 | OXYGEN SATURATION: 97 % | WEIGHT: 177 LBS | HEART RATE: 73 BPM | TEMPERATURE: 95.9 F | HEIGHT: 68 IN | DIASTOLIC BLOOD PRESSURE: 74 MMHG | SYSTOLIC BLOOD PRESSURE: 130 MMHG | RESPIRATION RATE: 16 BRPM

## 2023-03-30 DIAGNOSIS — K29.00 ACUTE GASTRITIS WITHOUT HEMORRHAGE, UNSPECIFIED GASTRITIS TYPE: ICD-10-CM

## 2023-03-30 DIAGNOSIS — Z01.818 PREOP GENERAL PHYSICAL EXAM: Primary | ICD-10-CM

## 2023-03-30 DIAGNOSIS — F41.1 ANXIETY STATE: ICD-10-CM

## 2023-03-30 DIAGNOSIS — E78.2 MIXED HYPERLIPIDEMIA: ICD-10-CM

## 2023-03-30 DIAGNOSIS — K29.70 GASTRITIS: Primary | ICD-10-CM

## 2023-03-30 DIAGNOSIS — R79.89 ELEVATED D-DIMER: ICD-10-CM

## 2023-03-30 DIAGNOSIS — F17.200 TOBACCO USE DISORDER: ICD-10-CM

## 2023-03-30 LAB
ALBUMIN SERPL BCG-MCNC: 4.2 G/DL (ref 3.5–5.2)
ALP SERPL-CCNC: 64 U/L (ref 40–129)
ALT SERPL W P-5'-P-CCNC: 26 U/L (ref 10–50)
ANION GAP SERPL CALCULATED.3IONS-SCNC: 10 MMOL/L (ref 7–15)
AST SERPL W P-5'-P-CCNC: 18 U/L (ref 10–50)
BASOPHILS # BLD AUTO: 0.1 10E3/UL (ref 0–0.2)
BASOPHILS NFR BLD AUTO: 1 %
BILIRUB SERPL-MCNC: 0.7 MG/DL
BUN SERPL-MCNC: 20.7 MG/DL (ref 8–23)
CALCIUM SERPL-MCNC: 9.8 MG/DL (ref 8.8–10.2)
CHLORIDE SERPL-SCNC: 105 MMOL/L (ref 98–107)
CHOLEST SERPL-MCNC: 183 MG/DL
CREAT SERPL-MCNC: 1.03 MG/DL (ref 0.67–1.17)
D DIMER PPP FEU-MCNC: 0.48 UG/ML FEU (ref 0–0.5)
DEPRECATED HCO3 PLAS-SCNC: 26 MMOL/L (ref 22–29)
EOSINOPHIL # BLD AUTO: 0.3 10E3/UL (ref 0–0.7)
EOSINOPHIL NFR BLD AUTO: 5 %
ERYTHROCYTE [DISTWIDTH] IN BLOOD BY AUTOMATED COUNT: 12.9 % (ref 10–15)
GFR SERPL CREATININE-BSD FRML MDRD: 81 ML/MIN/1.73M2
GLUCOSE SERPL-MCNC: 95 MG/DL (ref 70–99)
HCT VFR BLD AUTO: 44.2 % (ref 40–53)
HDLC SERPL-MCNC: 58 MG/DL
HGB BLD-MCNC: 14.8 G/DL (ref 13.3–17.7)
IMM GRANULOCYTES # BLD: 0 10E3/UL
IMM GRANULOCYTES NFR BLD: 0 %
INR PPP: 1.06 (ref 0.85–1.15)
LDLC SERPL CALC-MCNC: 98 MG/DL
LYMPHOCYTES # BLD AUTO: 1.6 10E3/UL (ref 0.8–5.3)
LYMPHOCYTES NFR BLD AUTO: 33 %
MCH RBC QN AUTO: 32.6 PG (ref 26.5–33)
MCHC RBC AUTO-ENTMCNC: 33.5 G/DL (ref 31.5–36.5)
MCV RBC AUTO: 97 FL (ref 78–100)
MONOCYTES # BLD AUTO: 0.3 10E3/UL (ref 0–1.3)
MONOCYTES NFR BLD AUTO: 6 %
NEUTROPHILS # BLD AUTO: 2.7 10E3/UL (ref 1.6–8.3)
NEUTROPHILS NFR BLD AUTO: 55 %
NONHDLC SERPL-MCNC: 125 MG/DL
NRBC # BLD AUTO: 0 10E3/UL
NRBC BLD AUTO-RTO: 0 /100
PLATELET # BLD AUTO: 199 10E3/UL (ref 150–450)
POTASSIUM SERPL-SCNC: 4.4 MMOL/L (ref 3.4–5.3)
PROT SERPL-MCNC: 7.2 G/DL (ref 6.4–8.3)
RBC # BLD AUTO: 4.54 10E6/UL (ref 4.4–5.9)
RETICS # AUTO: 0.05 10E6/UL (ref 0.03–0.1)
RETICS/RBC NFR AUTO: 1.1 % (ref 0.5–2)
SODIUM SERPL-SCNC: 141 MMOL/L (ref 136–145)
TRIGL SERPL-MCNC: 136 MG/DL
WBC # BLD AUTO: 5 10E3/UL (ref 4–11)

## 2023-03-30 PROCEDURE — 85060 BLOOD SMEAR INTERPRETATION: CPT | Performed by: PATHOLOGY

## 2023-03-30 PROCEDURE — 80053 COMPREHEN METABOLIC PANEL: CPT

## 2023-03-30 PROCEDURE — 80061 LIPID PANEL: CPT | Performed by: FAMILY MEDICINE

## 2023-03-30 PROCEDURE — 99214 OFFICE O/P EST MOD 30 MIN: CPT | Performed by: FAMILY MEDICINE

## 2023-03-30 PROCEDURE — 85610 PROTHROMBIN TIME: CPT

## 2023-03-30 PROCEDURE — 85379 FIBRIN DEGRADATION QUANT: CPT

## 2023-03-30 PROCEDURE — 85045 AUTOMATED RETICULOCYTE COUNT: CPT

## 2023-03-30 PROCEDURE — 99213 OFFICE O/P EST LOW 20 MIN: CPT | Performed by: CLINICAL NURSE SPECIALIST

## 2023-03-30 PROCEDURE — 36415 COLL VENOUS BLD VENIPUNCTURE: CPT

## 2023-03-30 PROCEDURE — 85025 COMPLETE CBC W/AUTO DIFF WBC: CPT

## 2023-03-30 RX ORDER — ROSUVASTATIN CALCIUM 10 MG/1
10 TABLET, COATED ORAL DAILY
Qty: 90 TABLET | Refills: 3 | Status: SHIPPED | OUTPATIENT
Start: 2023-03-30

## 2023-03-30 ASSESSMENT — PAIN SCALES - GENERAL
PAINLEVEL: NO PAIN (0)
PAINLEVEL: NO PAIN (0)

## 2023-03-30 ASSESSMENT — ENCOUNTER SYMPTOMS
FEVER: 0
CHILLS: 0
DIFFICULTY URINATING: 0
PALPITATIONS: 0
ABDOMINAL PAIN: 1
SHORTNESS OF BREATH: 0
WHEEZING: 0

## 2023-03-30 NOTE — PATIENT INSTRUCTIONS
We put in orders for pulmonary function testing    Restart your Crestor (cholesterol medication)  Recheck labs in one month. Lab order placed    No NSAIDs (aleve, motrin, ibuprofen )  Okay to use tylenol       For informational purposes only. Not to replace the advice of your health care provider. Copyright   2003, 2019 [facility]. All rights reserved. Clinically reviewed by Sarah Eldridge MD. [employer] [id number] - REV .  Preparing for Your Surgery  Getting started  A nurse will call you to review your health history and instructions. They will give you an arrival time based on your scheduled surgery time. Please be ready to share:  Your doctor's clinic name and phone number  Your medical, surgical, and anesthesia history  A list of allergies and sensitivities  A list of medicines, including herbal treatments and over-the-counter drugs  Whether the patient has a legal guardian (ask how to send us the papers in advance)  Please tell us if you're pregnant--or if there's any chance you might be pregnant. Some surgeries may injure a fetus (unborn baby), so they require a pregnancy test. Surgeries that are safe for a fetus don't always need a test, and you can choose whether to have one.   If you have a child who's having surgery, please ask for a copy of Preparing for Your Child's Surgery.    Preparing for surgery  Within 10 to 30 days of surgery: Have a pre-op exam (sometimes called an H&P, or History and Physical). This can be done at a clinic or pre-operative center.  If you're having a , you may not need this exam. Talk to your care team.  At your pre-op exam, talk to your care team about all medicines you take. If you need to stop any medicines before surgery, ask when to start taking them again.  We do this for your safety. Many medicines can make you bleed too much during surgery. Some change how well surgery (anesthesia) drugs work.  Call your insurance company to let them know you're  having surgery. (If you don't have insurance, call [phone].)  Call your clinic if there's any change in your health. This includes signs of a cold or flu (sore throat, runny nose, cough, rash, fever). It also includes a scrape or scratch near the surgery site.  If you have questions on the day of surgery, call your hospital or surgery center.  Eating and drinking guidelines  For your safety: Unless your surgeon tells you otherwise, follow the guidelines below.  Eat and drink as usual until 8 hours before you arrive for surgery. After that, no food or milk.  Drink clear liquids until 2 hours before you arrive. These are liquids you can see through, like water, Gatorade, and Propel Water. They also include plain black coffee and tea (no cream or milk), candy, and breath mints. You can spit out gum when you arrive.  If you drink alcohol: Stop drinking it the night before surgery.  If your care team tells you to take medicine on the morning of surgery, it's okay to take it with a sip of water.  Preventing infection  Shower or bathe the night before and morning of your surgery. Follow the instructions your clinic gave you. (If no instructions, use regular soap.)  Don't shave or clip hair near your surgery site. We'll remove the hair if needed.  Don't smoke or vape the morning of surgery. You may chew nicotine gum up to 2 hours before surgery. A nicotine patch is okay.  Note: Some surgeries require you to completely quit smoking and nicotine. Check with your surgeon.  Your care team will make every effort to keep you safe from infection. We will:  Clean our hands often with soap and water (or an alcohol-based hand rub).  Clean the skin at your surgery site with a special soap that kills germs.  Give you a special gown to keep you warm. (Cold raises the risk of infection.)  Wear special hair covers, masks, gowns and gloves during surgery.  Give antibiotic medicine, if prescribed. Not all surgeries need  antibiotics.  What to bring on the day of surgery  Photo ID and insurance card  Copy of your health care directive, if you have one  Glasses and hearing aids (bring cases)  You can't wear contacts during surgery  Inhaler and eye drops, if you use them (tell us about these when you arrive)  CPAP machine or breathing device, if you use them  A few personal items, if spending the night  If you have . . .  A pacemaker, ICD (cardiac defibrillator) or other implant: Bring the ID card.  An implanted stimulator: Bring the remote control.  A legal guardian: Bring a copy of the certified (court-stamped) guardianship papers.  Please remove any jewelry, including body piercings. Leave jewelry and other valuables at home.  If you're going home the day of surgery  You must have a responsible adult drive you home. They should stay with you overnight as well.  If you don't have someone to stay with you, and you aren't safe to go home alone, we may keep you overnight. Insurance often won't pay for this.  After surgery  If it's hard to control your pain or you need more pain medicine, please call your surgeon's office.  Questions?   If you have any questions for your care team, list them here: _________________________________________________________________________________________________________________________________________________________________________ ____________________________________ ____________________________________ ____________________________________

## 2023-03-30 NOTE — PATIENT INSTRUCTIONS
Thank you for allowing Dr. Oglesby and our surgical team to participate in your care. Please call our health unit coordinator at [phone] with scheduling questions or the nurse at [phone] with any other questions or concerns.      You have been scheduled for: UPPER ENDOSCOPY with  on Tuesday April 18.     Please see handout for additional instruction.  You WILL need a pre-operative appointment with your primary care provider.  You may call [phone] or [phone] with any questions.

## 2023-03-30 NOTE — PROGRESS NOTES
Surgery Consult Clinic Note      RE: Pollo Garrett  : 1960      Chief Complaint:  Gastritis    History of Present Illness:  I am seeing Pollo Garrett at the request of Jaxson Heaton NP for evaluation regarding gastritis and consideration for esophagogastroduodenoscopy.  Current symptoms include upper abdominal pain and epigastric pain. He has been treated for gastritis with famotidine and omeprazole.  Denies changes in voice.   He does use tobacco, drinks several cups of coffee a day, ETOH drinker.  Previous abdominal surgeries include hernia repair.  He specifically denies fever, chills, nausea, vomiting, chest pain, shortness of breath or palpitations.    Patient is seen with his wife, Lynn, present today.     Medical history:  Past Medical History:   Diagnosis Date     Depressive disorder        Surgical history:  Past Surgical History:   Procedure Laterality Date     HERNIA REPAIR         Family history:  Family History   Problem Relation Age of Onset     Cerebrovascular Disease Father      Diabetes Brother      Coronary Artery Disease Brother         46       Medications:  Prior to Admission medications    Medication Sig Start Date End Date Taking? Authorizing Provider   albuterol (PROAIR HFA/PROVENTIL HFA/VENTOLIN HFA) 108 (90 Base) MCG/ACT inhaler Inhale 2 puffs into the lungs every 4 hours as needed for shortness of breath, wheezing or cough 3/18/23  Yes Cuca Valdes PA-C   famotidine (PEPCID) 20 MG tablet Take 1 tablet (20 mg) by mouth 2 times daily for 7 days 3/24/23 3/31/23 Yes Jaxson Heaton APRN CNP   omeprazole (PRILOSEC) 20 MG DR capsule Take 1 capsule (20 mg) by mouth 2 times daily for 30 days 3/24/23 4/23/23 Yes Jaxson Heaton APRN CNP   celecoxib (CELEBREX) 100 MG capsule Take 1 capsule (100 mg) by mouth 2 times daily  Patient not taking: Reported on 3/21/2023 1/10/22   Kasia Diggs MD   naproxen sodium 220 MG capsule Take 220 mg by mouth 2 times daily (with  "meals)  Patient not taking: Reported on 3/21/2023    Reported, Patient   predniSONE (DELTASONE) 20 MG tablet Take two tablets (= 40mg) each day for 5 (five) days  Patient not taking: Reported on 3/30/2023 3/18/23   Cuca Valdes PA-C   rosuvastatin (CRESTOR) 10 MG tablet Take 1 tablet (10 mg) by mouth daily  Patient not taking: Reported on 3/21/2023 1/10/22   Kasia Diggs MD       Allergies:  The patient is allergic to homeopathic products [cold-eeze].    Social history:  Social History     Tobacco Use     Smoking status: Every Day     Packs/day: 0.50     Years: 43.00     Pack years: 21.50     Types: Cigarettes     Start date: 1978     Smokeless tobacco: Never   Substance Use Topics     Alcohol use: Yes     Comment: beer - occasionnaly         Review of Systems:  Constitutional, HEENT, cardiovascular, pulmonary, gi and gu systems are negative, except as otherwise noted.      Physical Examination:  /74 (BP Location: Left arm, Cuff Size: Adult Regular)   Pulse 73   Temp (!) 95.9  F (35.5  C) (Tympanic)   Resp 16   Ht 1.727 m (5' 8\")   Wt 80.3 kg (177 lb)   SpO2 97%   BMI 26.91 kg/m    General: Alert and orientedx4, no acute distress, well-developed/well-nourished, ambulating without assistance  HEENT: normocephalic atraumatic, extraocular movements intact; Trachea mideline  Chest:   Respirations even and unlabored  Extremities: Cursory exam unremarkable.    Neuro: CN 2-12 grossly intact, no focal deficit, GCS 15  Psych: Pleasant, calm, asks appropriate questions    Assessment/Plan:  #1 Esophagogastroduodenoscopy     We discussed lifestyle changes including tobacco cessation, refraining from coffee, tomato-based foods, fatty foods (especially fast food), citrus, peppermint, chocolate, alcohol, NSAIDS, and carbonated beverages.  We discussed elevating the head of his bed, maintaining a healthy weight, and refraining from eating within 3 hours of lying down.  I educated him on taking omeprazole " "30\"-60\" before eating.  He verbalizes understanding and indicates that he IS interested in smoking cessation or cutting back on coffee specifically.    The indications, risks, benefits and technical aspects of esophagogastroduodenoscopy were reviewed, his questions were asked and answered. Antral biopsy for histologic examination will be performed and the place of H. pylori in gastritis was discussed. Preoperative preparation, npo after midnight preceding the date was discussed and a request made to hold aspirin containing agents one week prior to ameliorate antiplatelet effect.  We will proceed with scheduling esophagogastroduodenoscopy with Dr. Oglesby at a mutually convenient time    Sariah Jerod [facility]  [address] [place] [address]    Referring Provider:  [place] [address]     Primary Care Provider:  Kasia Diggs  "

## 2023-04-04 ENCOUNTER — TELEPHONE (OUTPATIENT)
Dept: SURGERY | Facility: OTHER | Age: 63
End: 2023-04-04

## 2023-04-04 LAB
PATH REPORT.FINAL DX SPEC: NORMAL
PATH REPORT.MICROSCOPIC SPEC OTHER STN: NORMAL
PATH REPORT.MICROSCOPIC SPEC OTHER STN: NORMAL

## 2023-04-04 NOTE — TELEPHONE ENCOUNTER
Pedro has decided not to have his procedure with Dr Oglesby.  He wants to cancel and does not want to reschedule.

## 2024-05-07 ENCOUNTER — TELEPHONE (OUTPATIENT)
Dept: FAMILY MEDICINE | Facility: OTHER | Age: 64
End: 2024-05-07

## 2025-04-23 ENCOUNTER — HOSPITAL ENCOUNTER (EMERGENCY)
Facility: HOSPITAL | Age: 65
Discharge: HOME | End: 2025-04-23

## 2025-04-23 ENCOUNTER — APPOINTMENT (OUTPATIENT)
Dept: GENERAL RADIOLOGY | Facility: HOSPITAL | Age: 65
End: 2025-04-23

## 2025-04-23 VITALS
WEIGHT: 177 LBS | TEMPERATURE: 96.9 F | HEIGHT: 68 IN | SYSTOLIC BLOOD PRESSURE: 165 MMHG | HEART RATE: 65 BPM | RESPIRATION RATE: 18 BRPM | OXYGEN SATURATION: 98 % | BODY MASS INDEX: 26.83 KG/M2 | DIASTOLIC BLOOD PRESSURE: 105 MMHG

## 2025-04-23 DIAGNOSIS — S90.32XA CONTUSION OF LEFT FOOT, INITIAL ENCOUNTER: ICD-10-CM

## 2025-04-23 PROCEDURE — 99213 OFFICE O/P EST LOW 20 MIN: CPT

## 2025-04-23 PROCEDURE — G0463 HOSPITAL OUTPT CLINIC VISIT: HCPCS

## 2025-04-23 PROCEDURE — 73630 X-RAY EXAM OF FOOT: CPT | Mod: LT

## 2025-04-23 PROCEDURE — 73630 X-RAY EXAM OF FOOT: CPT | Mod: 26 | Performed by: RADIOLOGY

## 2025-04-23 ASSESSMENT — ACTIVITIES OF DAILY LIVING (ADL): ADLS_ACUITY_SCORE: 41

## 2025-04-23 ASSESSMENT — ENCOUNTER SYMPTOMS
COLOR CHANGE: 1
WOUND: 0
ACTIVITY CHANGE: 1
ARTHRALGIAS: 1
NUMBNESS: 1

## 2025-04-23 NOTE — DISCHARGE INSTRUCTIONS
You can take 650-1000mg of tylenol every 6 hours as needed, max of 3000mg in 24 hours and 600-800mg of ibuprofen every 8 hours as needed, max of 2400mg in 24 hours.     Ice for 15-20 minutes every 2-3 hours. Please make sure to protect skin to prevent frost bite.     Return with any new or concerning symptoms.     Follow up appt made with Mickey Temple at 8:00 AM on 4/29

## 2025-04-23 NOTE — Clinical Note
Pollo Garrett was seen and treated in our emergency department on 4/23/2025.  He may return to work on 04/30/2025.  Can return sooner if symptoms improve.      If you have any questions or concerns, please don't hesitate to call.      Amanda Arriaga, NP

## 2025-04-23 NOTE — ED TRIAGE NOTES
Pt presents with concern of Left foot pain. Pt reports dropping a ball hitch on his foot  Injury occurred yesterday denies Otc medications.

## 2025-04-23 NOTE — ED PROVIDER NOTES
History     Chief Complaint   Patient presents with    Foot Pain     HPI  Pollo Garrett is a 64 year old male who presents to the urgent care with complaints of left foot pain. Dropped a hitch on foot at work yesterday. No blood thinners. Has been able to ambulate and bear weight. Some tingling noted. No hx of DM. No OTC medications PTA.     Allergies:  Allergies   Allergen Reactions    Homeopathic Products [Cold-Eeze]      Other reaction(s): Runny Nose       Problem List:    Patient Active Problem List    Diagnosis Date Noted    Mixed hyperlipidemia 01/10/2022     Priority: Medium    Anxiety state 06/09/2018     Priority: Medium    Tobacco use disorder 06/09/2018     Priority: Medium    Acute pain of left knee 06/05/2018     Priority: Medium    Arthritis of hand 03/20/2017     Priority: Medium    Osteoarthrosis, forearm 09/29/2014     Priority: Medium        Past Medical History:    Past Medical History:   Diagnosis Date    Depressive disorder        Past Surgical History:    Past Surgical History:   Procedure Laterality Date    HERNIA REPAIR         Family History:    Family History   Problem Relation Age of Onset    Cerebrovascular Disease Father     Diabetes Brother     Coronary Artery Disease Brother         46       Social History:  Marital Status:   [2]  Social History     Tobacco Use    Smoking status: Every Day     Current packs/day: 0.50     Average packs/day: 0.5 packs/day for 47.3 years (23.7 ttl pk-yrs)     Types: Cigarettes     Start date: 1978    Smokeless tobacco: Never   Substance Use Topics    Alcohol use: Yes     Comment: beer - occasionnaly        Medications:    albuterol (PROAIR HFA/PROVENTIL HFA/VENTOLIN HFA) 108 (90 Base) MCG/ACT inhaler  naproxen sodium 220 MG capsule  rosuvastatin (CRESTOR) 10 MG tablet          Review of Systems   Constitutional:  Positive for activity change.   Musculoskeletal:  Positive for arthralgias.   Skin:  Positive for color change. Negative for wound.  "  Neurological:  Positive for numbness.   All other systems reviewed and are negative.      Physical Exam   BP: (!) 165/105  Pulse: 65  Temp: 96.9  F (36.1  C)  Resp: 18  Height: 172.7 cm (5' 8\")  Weight: 80.3 kg (177 lb)  SpO2: 98 %      Physical Exam  Vitals and nursing note reviewed.   Constitutional:       General: He is not in acute distress.     Appearance: Normal appearance. He is not ill-appearing or toxic-appearing.   Cardiovascular:      Pulses: Normal pulses.   Musculoskeletal:         General: Tenderness present. No deformity.      Left foot: Normal range of motion and normal capillary refill. Tenderness and bony tenderness present. Normal pulse.        Feet:    Skin:     General: Skin is warm and dry.      Capillary Refill: Capillary refill takes less than 2 seconds.      Findings: Bruising present.   Neurological:      General: No focal deficit present.      Mental Status: He is alert and oriented to person, place, and time.   Psychiatric:         Mood and Affect: Mood normal.         Behavior: Behavior normal.         Thought Content: Thought content normal.         Judgment: Judgment normal.         ED Course        Procedures       Results for orders placed or performed during the hospital encounter of 04/23/25 (from the past 24 hours)   Foot XR, G/E 3 views, left    Narrative    PROCEDURE:  XR FOOT LEFT G/E 3 VIEWS    HISTORY: pain in dorsum of foot and into 2nd and 3rd toes. dropped  heavy object on foot.    COMPARISON:  None.    TECHNIQUE:  3 views left foot.    FINDINGS:  No fracture or dislocation is identified. The joint spaces  are preserved. No foreign body is seen.       Impression    IMPRESSION: No acute fracture.      MONTSERRAT MORRISSEY MD         SYSTEM ID:  [id number]       Medications - No data to display    Assessments & Plan (with Medical Decision Making)     I have reviewed the nursing notes.    I have reviewed the findings, diagnosis, plan and need for follow up with the " patient.  Pollo Garrett is a 64 year old male who presents to the urgent care with complaints of left foot pain. Dropped a hitch on foot at work yesterday. No blood thinners. Has been able to ambulate and bear weight. Some tingling noted. No hx of DM. No OTC medications PTA.     MDM: afebrile, non toxic in appearance with no noted distress. Fully ambulatory. Strong pulses to left lower extremity. CMS intact and cap refill within 2 seconds. Mild swelling with bruising over dorsum of left foot radiating in to second and third toes. XR reviewed with no acute fracture or dislocation. Ace wrap placed. Supportive measures and return precautions discussed. Work comp follow up appt made for 4/29 with Mickey Temple. He is in agreement with plan.     (S90.32XA) Contusion of left foot, initial encounter  Plan: You can take 650-1000mg of tylenol every 6 hours as needed, max of 3000mg in 24 hours and 600-800mg of ibuprofen every 8 hours as needed, max of 2400mg in 24 hours.     Ice for 15-20 minutes every 2-3 hours. Please make sure to protect skin to prevent frost bite.     Return with any new or concerning symptoms.     Follow up appt made with Mickey Temple at 8:00 AM on 4/29. Understanding verbalized.     New Prescriptions    No medications on file       Final diagnoses:   Contusion of left foot, initial encounter       4/23/2025   [facility]       Amanda Arriaga NP  04/23/25 7824

## 2025-04-29 ENCOUNTER — OFFICE VISIT (OUTPATIENT)
Dept: FAMILY MEDICINE | Facility: OTHER | Age: 65
End: 2025-04-29
Attending: CHIROPRACTOR

## 2025-04-29 VITALS
RESPIRATION RATE: 16 BRPM | HEART RATE: 67 BPM | TEMPERATURE: 96.6 F | SYSTOLIC BLOOD PRESSURE: 146 MMHG | OXYGEN SATURATION: 97 % | DIASTOLIC BLOOD PRESSURE: 90 MMHG | HEIGHT: 68 IN | BODY MASS INDEX: 28.72 KG/M2 | WEIGHT: 189.5 LBS

## 2025-04-29 DIAGNOSIS — S90.129A CONTUSION OF FOOT INCLUDING TOES, INITIAL ENCOUNTER: Primary | ICD-10-CM
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
## 2025-04-29 DIAGNOSIS — S90.30XA CONTUSION OF FOOT INCLUDING TOES, INITIAL ENCOUNTER: Primary | ICD-10-CM

## 2025-04-29 ASSESSMENT — PAIN SCALES - GENERAL: PAINLEVEL_OUTOF10: MILD PAIN (1)

## 2025-04-29 NOTE — PROGRESS NOTES
Occupational Visit     SUBJECTIVE:  Pollo Garrett, 64 year old, male is seen for new evaluation and treatment of occupational injury. Date of injury is 04/22/2025.    Linked Episodes   Type: Episode: Status: Noted: Resolved: Last update: Updated by:   ASHLEY SAEED TOBACCO CESSATION CARE COMPANION EPISODE Tobacco Cessation Care Companion - [facility] Active 4/29/2025 4/29/2025  8:01 AM Mickey Temple DC      Comments:Episode created 04/29/25 0801 CDT   WORK COMP [employer] Active 4/29/2025 4/29/2025  8:03 AM Osiel Pablo      Comments:Contusion of foot.       Musculoskeletal problem/pain    Duration: 04/22/2025  Description  Location: Left foot toes  Intensity:  mild  Accompanying signs and symptoms: Cramps, bruising  History  Previous similar problem: no   Previous evaluation:  x-ray  Precipitating or alleviating factors:  Trauma or overuse: no   Aggravating factors include: none  Therapies tried and outcome: support wrap      Allergies   Allergen Reactions    Homeopathic Products [Cold-Eeze]      Other reaction(s): Runny Nose         Review of Systems:    OBJECTIVE:  Vitals:    04/29/25 0759   BP: (!) 146/90   Pulse: 67   Resp: 16   Temp: (!) 96.6  F (35.9  C)   SpO2: 97%               Exam:    Labs: No results found for this or any previous visit (from the past 24 hours).      ASSESSMENT/PLAN:  There are no diagnoses linked to this encounter.

## 2025-04-29 NOTE — PROGRESS NOTES
CHIEF COMPLAINT:   Chief Complaint   Patient presents with    Work Comp       HISTORY OF PRESENTING WORK INJURY     Pollo is a pleasant 63 y/o who sustained an injury on 4/22/2025 while working at [employer].  A truck hitch with ball attached fell approximately 4 feet onto his left foot.  This primarily struck his left 4th toe.  X-rays were performed at ED and negative for fracture.  He is doing well today with some residual pain and bruising.  Wishes to return without any restrictions.  No edema or new sensory changes.    PAST MEDICAL HISTORY:  Past Medical History:   Diagnosis Date    Depressive disorder        PAST SURGICAL HISTORY:  Past Surgical History:   Procedure Laterality Date    HERNIA REPAIR         ALLERGIES:  Allergies   Allergen Reactions    Homeopathic Products [Cold-Eeze]      Other reaction(s): Runny Nose       CURRENT MEDICATIONS:  Current Outpatient Medications   Medication Sig Dispense Refill    albuterol (PROAIR HFA/PROVENTIL HFA/VENTOLIN HFA) 108 (90 Base) MCG/ACT inhaler Inhale 2 puffs into the lungs every 4 hours as needed for shortness of breath, wheezing or cough 18 g 0    naproxen sodium 220 MG capsule Take 220 mg by mouth 2 times daily (with meals) (Patient not taking: Reported on 3/21/2023)      rosuvastatin (CRESTOR) 10 MG tablet Take 1 tablet (10 mg) by mouth daily 90 tablet 3       SOCIAL HISTORY:  Social History     Socioeconomic History    Marital status:      Spouse name: Not on file    Number of children: Not on file    Years of education: Not on file    Highest education level: Not on file   Occupational History    Not on file   Tobacco Use    Smoking status: Every Day     Current packs/day: 0.50     Average packs/day: 0.5 packs/day for 47.3 years (23.7 ttl pk-yrs)     Types: Cigarettes     Start date: 1978    Smokeless tobacco: Never   Substance and Sexual Activity    Alcohol use: Yes     Comment: beer - occasionnaly    Drug use: Not on file    Sexual  "activity: Not on file   Other Topics Concern    Not on file   Social History Narrative    Not on file     Social Drivers of Health     Financial Resource Strain: Not on file   Food Insecurity: Not on file   Transportation Needs: Not on file   Physical Activity: Not on file   Stress: Not on file   Social Connections: Not on file   Interpersonal Safety: Not on file   Housing Stability: Not on file       FAMILY HISTORY:  Family History   Problem Relation Age of Onset    Cerebrovascular Disease Father     Diabetes Brother     Coronary Artery Disease Brother         46       REVIEW OF SYSTEMS:    Unremarkable other than chief complaint      PHYSICAL EXAM:   BP (!) 146/90   Pulse 67   Temp (!) 96.6  F (35.9  C) (Tympanic)   Resp 16   Ht 1.727 m (5' 8\")   Wt 86 kg (189 lb 8 oz)   SpO2 97%   BMI 28.81 kg/m   Body mass index is 28.81 kg/m .    General Appearance: Pleasant, alert, appropriate appearance for age. No acute distress.    Patient is ambulatory without assistance and is able to move freely about the examination room.    Normal gait and stance with equal weight distribution.    There is bruising at the distal end of 4th phalange of left foot.  Pain with palpation.  Full movement of digits.  No edema    Discussed x-ray findings with patient        IMPRESSION/PLAN:    Sustained contusion injury to left foot.  This may take another 4-5 more weeks to be pain-free, but orthopedically he demonstrates normal exam.  Instructed to return by June 1, 2025 with any concerns.  Otherwise will close case that day.      Issued full workability but instructed him to monitor for discomfort and especially new findings and notify us.  We may amend workability if this is occurs.  Two copies of new workability given.       Total time spent today in chart review/preparation, face to face evaluation, and documentation: 21 minutes.      Mickey Temple DC, DABFP  Director - Occupational Medicine Department  Diplomate of the American " Board of [employer]essionals      8:23 AM 4/29/2025

## 2025-05-21 ENCOUNTER — TELEPHONE (OUTPATIENT)
Dept: FAMILY MEDICINE | Facility: OTHER | Age: 65
End: 2025-05-21

## 2025-05-21 NOTE — TELEPHONE ENCOUNTER
4:29 PM    Reason for Call: Phone Call    Description: Patient is calling wanting to get some [facility] derm to help quit smoking. Patient would use [facility] [place].    Was an appointment offered for this call? No  If yes : Appointment type              Date    Preferred method for responding to this message: Telephone Call  What is your phone number ?  [phone]    If we cannot reach you directly, may we leave a detailed response at the number you provided? Yes    Can this message wait until your PCP/provider returns, if available today? YES, Provider is out    Kasey Figueredo

## 2025-06-23 NOTE — PROGRESS NOTES
Assessment & Plan     Bruising tendency  Plt, inr, aPTT normal. Most likely d/t use of Advil  - CBC with Platelets & Differential  - Reflex INR  - Partial thromboplastin time    Benign essential hypertension  BP consistently high. Cr/gfr wnl   - start lisinopril 20mg. Rx sent   - recheck one month with labs    Mixed hyperlipidemia  LDL of 134. LFT wnl. ASCVD risk of 21%. Has not been taking crestor   - Comprehensive metabolic panel  - Lipid Profile  - Rx sent for crestor 10mg     EGAN (dyspnea on exertion) / Chest pain, unspecified type  Chest pain with exertion. Not taking statin, current smoker. Will obtain stress test  - NM Lexiscan stress test; Future    Chronic pain of both knees  Currently taking (2) Advil per day with bruising issues. Will trial topical voltaren  OA w/ recommendation for knee replacement   - diclofenac (VOLTAREN) 1 % topical gel; Apply 4 g topically 3 times daily as needed for moderate pain.    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  -  Aorta Medicare AAA Screening; Future    Personal history of tobacco use  - Prof fee: Shared Decision Making for Lung Cancer Screening  - CT Chest Lung Cancer Scrn Low Dose wo; Future  - [place] Aorta Medicare AAA Screening; Future    The longitudinal plan of care for the diagnosis(es)/condition(s) as documented were addressed during this visit. Due to the added complexity in care, I will continue to support Pedro in the subsequent management and with ongoing continuity of care.      Nicotine/Tobacco Cessation  He reports that he has been smoking cigarettes. He started smoking about 47 years ago. He has  a 23.7 pack-year smoking history. He has never used smokeless tobacco.  Nicotine/Tobacco Cessation Plan  Pharmacotherapies : Nicotine patch          Follow-up  Return in about 4 weeks (around 7/22/2025) for BP Recheck, Lab Work.    Pat Vasquez is a 64 year old, presenting for the following health issues:  Lipids, Hypertension, and Bleeding/Bruising        6/24/2025     8:20 AM   Additional Questions   Roomed by Mary Jo Zavala   Accompanied by self     History of Present Illness       Reason for visit:  Bleed easy  Symptom onset:  More than a month  Symptoms include:  Eay bruising  Symptom intensity:  Mild  Symptom progression:  Staying the same  Had these symptoms before:  Yes  Has tried/received treatment for these symptoms:  No  What makes it worse:  No  What makes it better:  Don't do anything   He is taking medications regularly.          Hyperlipidemia Follow-Up    Are you regularly taking any medication or supplement to lower your cholesterol?   No  Are you having muscle aches or other side effects that you think could be caused by your cholesterol lowering medication?  No    Hypertension Follow-up    Do you check your blood pressure regularly outside of the clinic? No   Are you following a low salt diet? No  Are your blood pressures ever more than 140 on the top number (systolic) OR more   than 90 on the bottom number (diastolic), for example 140/90? Yes    - has not been on BP medication     BP Readings from Last 2 Encounters:   06/24/25 (!) 150/90   04/29/25 (!) 146/90     Continuous bruising     Duration: Ongoing  Description (location/character/radiation): Arms   Intensity:  moderate  Accompanying signs and symptoms: Cuts himself at work and doesn't quit bleeding for awhile, bruising when he hits something  History (similar episodes/previous evaluation): None  Precipitating or alleviating factors: None  Therapies tried and outcome: None    - bruising resolves without  - takes Advil - one to two per  "day  - taking advil for knee pain   - recommendation for knee replacement       # Chest pain   - chest pain with activity   - not taking crestor     # Wellness:  - Immunizations: pharmacy: tdap, shingles.  Clinic: PCV20 (updating), covid (fall)   - Lipids: updating today  - Dexa scan:  - Colon cancer screening: order placed for cologuard   - PSA: - updating today   - Lung cancer screening: smoking, meets criteria. - order placed.   - AAA screening: order placed.     # smoking cessation  - nicotine patch -works      Review of Systems  Constitutional, HEENT, cardiovascular, pulmonary, gi and gu systems are negative, except as otherwise noted.      Objective    BP (!) 150/90   Pulse 67   Temp 97.4  F (36.3  C) (Tympanic)   Resp 17   Ht 1.727 m (5' 8\")   Wt 84.3 kg (185 lb 12.8 oz)   SpO2 97%   BMI 28.25 kg/m    Body mass index is 28.25 kg/m .  Physical Exam  Constitutional:       General: He is not in acute distress.     Appearance: He is not ill-appearing.   Cardiovascular:      Rate and Rhythm: Normal rate and regular rhythm.      Heart sounds: No murmur heard.  Pulmonary:      Effort: Pulmonary effort is normal. No respiratory distress.      Breath sounds: No wheezing or rales.   Musculoskeletal:      Right lower leg: No edema.      Left lower leg: No edema.   Neurological:      Mental Status: He is alert.   Psychiatric:         Mood and Affect: Mood normal.            Results for orders placed or performed in visit on 06/24/25 (from the past 24 hours)   Comprehensive metabolic panel   Result Value Ref Range    Sodium 140 135 - 145 mmol/L    Potassium 4.6 3.4 - 5.3 mmol/L    Carbon Dioxide (CO2) 24 22 - 29 mmol/L    Anion Gap 9 7 - 15 mmol/L    Urea Nitrogen 21.7 8.0 - 23.0 mg/dL    Creatinine 1.11 0.67 - 1.17 mg/dL    GFR Estimate 73 >60 mL/min/1.73m2    Calcium 9.7 8.8 - 10.4 mg/dL    Chloride 107 98 - 107 mmol/L    Glucose 100 (H) 70 - 99 mg/dL    Alkaline Phosphatase 73 40 - 150 U/L    AST 18 0 - 45 U/L "    ALT 22 0 - 70 U/L    Protein Total 7.4 6.4 - 8.3 g/dL    Albumin 4.3 3.5 - 5.2 g/dL    Bilirubin Total 0.8 <=1.2 mg/dL    Patient Fasting > 8hrs? Yes    CBC with Platelets & Differential    Narrative    The following orders were created for panel order CBC with Platelets & Differential.  Procedure                               Abnormality         Status                     ---------                               -----------         ------                     CBC with platelets and ...[[id number]]                      Final result                 Please view results for these tests on the individual orders.   Lipid Profile   Result Value Ref Range    Cholesterol 217 (H) <200 mg/dL    Triglycerides 97 <150 mg/dL    Direct Measure HDL 64 >=40 mg/dL    LDL Cholesterol Calculated 134 (H) <100 mg/dL    Non HDL Cholesterol 153 (H) <130 mg/dL    Patient Fasting > 8hrs? Yes     Narrative    Cholesterol  Desirable: < 200 mg/dL  Borderline High: 200 - 239 mg/dL  High: >= 240 mg/dL    Triglycerides  Normal: < 150 mg/dL  Borderline High: 150 - 199 mg/dL  High: 200-499 mg/dL  Very High: >= 500 mg/dL    Direct Measure HDL  Female: >= 50 mg/dL   Male: >= 40 mg/dL    LDL Cholesterol  Desirable: < 100 mg/dL  Above Desirable: 100 - 129 mg/dL   Borderline High: 130 - 159 mg/dL   High:  160 - 189 mg/dL   Very High: >= 190 mg/dL    Non HDL Cholesterol  Desirable: < 130 mg/dL  Above Desirable: 130 - 159 mg/dL  Borderline High: 160 - 189 mg/dL  High: 190 - 219 mg/dL  Very High: >= 220 mg/dL   Reflex INR   Result Value Ref Range    INR 0.93 0.85 - 1.15    PT 12.6 11.8 - 14.8 Seconds   Partial thromboplastin time   Result Value Ref Range    aPTT 29 22 - 38 Seconds   PSA, screen   Result Value Ref Range    Prostate Specific Antigen Screen 0.11 0.00 - 4.50 ng/mL    Narrative    This result is obtained using the Roche Elecsys total PSA method on the david e601 immunoassay analyzer, which is an ultrasensitive method. Results obtained with  different assay methods or kits cannot be used interchangeably.  This test is intended for initial prostate cancer screening. PSA values exceeding the age-specific limits are suspicious for prostate disease, but additional testing, such as prostate biopsy, is needed to diagnose prostate pathology. The American Cancer Society recommends annual examination with digital rectal examination and serum PSA beginning at age 50 and for men with a life expectancy of at least 10 years after detection of prostate cancer. For men in high-risk groups, such as  Americans or men with a first-degree relative diagnosed at a younger age, testing should begin at a younger age. It is generally recommended that information be provided to patients about the benefits and limitations of testing and treatment so they can make informed decisions.   CBC with platelets and differential   Result Value Ref Range    WBC Count 4.1 4.0 - 11.0 10e3/uL    RBC Count 4.45 4.40 - 5.90 10e6/uL    Hemoglobin 14.7 13.3 - 17.7 g/dL    Hematocrit 43.8 40.0 - 53.0 %    MCV 98 78 - 100 fL    MCH 33.0 26.5 - 33.0 pg    MCHC 33.6 31.5 - 36.5 g/dL    RDW 13.0 10.0 - 15.0 %    Platelet Count 234 150 - 450 10e3/uL    % Neutrophils 55 %    % Lymphocytes 35 %    % Monocytes 7 %    % Eosinophils 2 %    % Basophils 1 %    % Immature Granulocytes 0 %    NRBCs per 100 WBC 0 <1 /100    Absolute Neutrophils 2.3 1.6 - 8.3 10e3/uL    Absolute Lymphocytes 1.4 0.8 - 5.3 10e3/uL    Absolute Monocytes 0.3 0.0 - 1.3 10e3/uL    Absolute Eosinophils 0.1 0.0 - 0.7 10e3/uL    Absolute Basophils 0.0 0.0 - 0.2 10e3/uL    Absolute Immature Granulocytes 0.0 <=0.4 10e3/uL    Absolute NRBCs 0.0 10e3/uL       The 10-year ASCVD risk score (Nova MACDONALD, et al., 2019) is: 21.7%    Values used to calculate the score:      Age: 64 years      Sex: Male      Is Non- : No      Diabetic: No      Tobacco smoker: Yes      Systolic Blood Pressure: 150 mmHg      Is BP  treated: No      HDL Cholesterol: 64 mg/dL      Total Cholesterol: 217 mg/dL      Signed Electronically by: Kasia Diggs MD  Lung Cancer Screening Shared Decision Making Visit     Pollo Garrett, a 64 year old male, is eligible for lung cancer screening    History   Smoking Status    Every Day    Types: Cigarettes   Smokeless Tobacco    Never       I have discussed with patient the risks and benefits of screening for lung cancer with low-dose CT.     The risks include:    radiation exposure: one low dose chest CT has as much ionizing radiation as about 15 chest x-rays, or 6 months of background radiation living in [place]      false positives: most findings/nodules are NOT cancer, but some might still require additional diagnostic evaluation, including biopsy    over-diagnosis: some slow growing cancers that might never have been clinically significant will be detected and treated unnecessarily     The benefit of early detection of lung cancer is contingent upon adherence to annual screening or more frequent follow up if indicated.     Furthermore, to benefit from screening, Pollo must be willing and able to undergo diagnostic procedures, if indicated. Although no specific guide is available for determining severity of comorbidities, it is reasonable to withhold screening in patients who have greater mortality risk from other diseases.     We did discuss that the best way to prevent lung cancer is to not smoke.    Some patients may value a numeric estimation of lung cancer risk when evaluating if lung cancer screening is right for them, here is one calculator:    ShouldIScreen

## 2025-06-24 ENCOUNTER — OFFICE VISIT (OUTPATIENT)
Dept: FAMILY MEDICINE | Facility: OTHER | Age: 65
End: 2025-06-24
Attending: FAMILY MEDICINE
Payer: COMMERCIAL

## 2025-06-24 ENCOUNTER — RESULTS FOLLOW-UP (OUTPATIENT)
Dept: FAMILY MEDICINE | Facility: OTHER | Age: 65
End: 2025-06-24

## 2025-06-24 VITALS
BODY MASS INDEX: 28.16 KG/M2 | TEMPERATURE: 97.4 F | HEART RATE: 67 BPM | RESPIRATION RATE: 17 BRPM | SYSTOLIC BLOOD PRESSURE: 150 MMHG | DIASTOLIC BLOOD PRESSURE: 90 MMHG | WEIGHT: 185.8 LBS | HEIGHT: 68 IN | OXYGEN SATURATION: 97 %

## 2025-06-24 DIAGNOSIS — F17.200 TOBACCO USE DISORDER: ICD-10-CM

## 2025-06-24 DIAGNOSIS — G89.29 CHRONIC PAIN OF BOTH KNEES: ICD-10-CM

## 2025-06-24 DIAGNOSIS — F41.1 ANXIETY STATE: ICD-10-CM

## 2025-06-24 DIAGNOSIS — R06.09 DOE (DYSPNEA ON EXERTION): ICD-10-CM

## 2025-06-24 DIAGNOSIS — R23.3 BRUISING TENDENCY: Primary | ICD-10-CM

## 2025-06-24 DIAGNOSIS — M25.561 CHRONIC PAIN OF BOTH KNEES: ICD-10-CM

## 2025-06-24 DIAGNOSIS — M25.562 CHRONIC PAIN OF BOTH KNEES: ICD-10-CM

## 2025-06-24 DIAGNOSIS — Z12.11 SPECIAL SCREENING FOR MALIGNANT NEOPLASMS, COLON: ICD-10-CM

## 2025-06-24 DIAGNOSIS — I10 BENIGN ESSENTIAL HYPERTENSION: ICD-10-CM

## 2025-06-24 DIAGNOSIS — E78.2 MIXED HYPERLIPIDEMIA: ICD-10-CM

## 2025-06-24 DIAGNOSIS — Z12.5 SCREENING FOR PROSTATE CANCER: ICD-10-CM

## 2025-06-24 DIAGNOSIS — Z23 NEED FOR VACCINATION: ICD-10-CM

## 2025-06-24 DIAGNOSIS — R07.9 CHEST PAIN, UNSPECIFIED TYPE: ICD-10-CM

## 2025-06-24 DIAGNOSIS — Z87.891 PERSONAL HISTORY OF TOBACCO USE: ICD-10-CM

## 2025-06-24 LAB
ALBUMIN SERPL BCG-MCNC: 4.3 G/DL (ref 3.5–5.2)
ALP SERPL-CCNC: 73 U/L (ref 40–150)
ALT SERPL W P-5'-P-CCNC: 22 U/L (ref 0–70)
ANION GAP SERPL CALCULATED.3IONS-SCNC: 9 MMOL/L (ref 7–15)
APTT PPP: 29 SECONDS (ref 22–38)
AST SERPL W P-5'-P-CCNC: 18 U/L (ref 0–45)
BASOPHILS # BLD AUTO: 0 10E3/UL (ref 0–0.2)
BASOPHILS NFR BLD AUTO: 1 %
BILIRUB SERPL-MCNC: 0.8 MG/DL
BUN SERPL-MCNC: 21.7 MG/DL (ref 8–23)
CALCIUM SERPL-MCNC: 9.7 MG/DL (ref 8.8–10.4)
CHLORIDE SERPL-SCNC: 107 MMOL/L (ref 98–107)
CHOLEST SERPL-MCNC: 217 MG/DL
CREAT SERPL-MCNC: 1.11 MG/DL (ref 0.67–1.17)
EGFRCR SERPLBLD CKD-EPI 2021: 73 ML/MIN/1.73M2
EOSINOPHIL # BLD AUTO: 0.1 10E3/UL (ref 0–0.7)
EOSINOPHIL NFR BLD AUTO: 2 %
ERYTHROCYTE [DISTWIDTH] IN BLOOD BY AUTOMATED COUNT: 13 % (ref 10–15)
FASTING STATUS PATIENT QL REPORTED: YES
FASTING STATUS PATIENT QL REPORTED: YES
GLUCOSE SERPL-MCNC: 100 MG/DL (ref 70–99)
HCO3 SERPL-SCNC: 24 MMOL/L (ref 22–29)
HCT VFR BLD AUTO: 43.8 % (ref 40–53)
HDLC SERPL-MCNC: 64 MG/DL
HGB BLD-MCNC: 14.7 G/DL (ref 13.3–17.7)
IMM GRANULOCYTES # BLD: 0 10E3/UL
IMM GRANULOCYTES NFR BLD: 0 %
INR PPP: 0.93 (ref 0.85–1.15)
LDLC SERPL CALC-MCNC: 134 MG/DL
LYMPHOCYTES # BLD AUTO: 1.4 10E3/UL (ref 0.8–5.3)
LYMPHOCYTES NFR BLD AUTO: 35 %
MCH RBC QN AUTO: 33 PG (ref 26.5–33)
MCHC RBC AUTO-ENTMCNC: 33.6 G/DL (ref 31.5–36.5)
MCV RBC AUTO: 98 FL (ref 78–100)
MONOCYTES # BLD AUTO: 0.3 10E3/UL (ref 0–1.3)
MONOCYTES NFR BLD AUTO: 7 %
NEUTROPHILS # BLD AUTO: 2.3 10E3/UL (ref 1.6–8.3)
NEUTROPHILS NFR BLD AUTO: 55 %
NONHDLC SERPL-MCNC: 153 MG/DL
NRBC # BLD AUTO: 0 10E3/UL
NRBC BLD AUTO-RTO: 0 /100
PLATELET # BLD AUTO: 234 10E3/UL (ref 150–450)
POTASSIUM SERPL-SCNC: 4.6 MMOL/L (ref 3.4–5.3)
PROT SERPL-MCNC: 7.4 G/DL (ref 6.4–8.3)
PROTHROMBIN TIME: 12.6 SECONDS (ref 11.8–14.8)
PSA SERPL DL<=0.01 NG/ML-MCNC: 0.11 NG/ML (ref 0–4.5)
RBC # BLD AUTO: 4.45 10E6/UL (ref 4.4–5.9)
SODIUM SERPL-SCNC: 140 MMOL/L (ref 135–145)
TRIGL SERPL-MCNC: 97 MG/DL
WBC # BLD AUTO: 4.1 10E3/UL (ref 4–11)

## 2025-06-24 RX ORDER — ROSUVASTATIN CALCIUM 10 MG/1
10 TABLET, COATED ORAL DAILY
Qty: 90 TABLET | Refills: 3 | Status: SHIPPED | OUTPATIENT
Start: 2025-06-24

## 2025-06-24 RX ORDER — NICOTINE 21 MG/24HR
1 PATCH, TRANSDERMAL 24 HOURS TRANSDERMAL EVERY 24 HOURS
Qty: 28 PATCH | Refills: 1 | Status: SHIPPED | OUTPATIENT
Start: 2025-06-24

## 2025-06-24 RX ORDER — LISINOPRIL 20 MG/1
20 TABLET ORAL DAILY
Qty: 90 TABLET | Refills: 1 | Status: SHIPPED | OUTPATIENT
Start: 2025-06-24

## 2025-06-24 ASSESSMENT — PAIN SCALES - GENERAL: PAINLEVEL_OUTOF10: NO PAIN (0)

## 2025-06-24 NOTE — PATIENT INSTRUCTIONS
We will call or MyChart you with your lab results.     We will call you with a medication to start for your high blood pressure     Get your shingles and update your tetanus at local pharmacy     Try Voltaren gel for your knee pain    Order placed for stress test     Order placed for cologuard   Lung Cancer Screening   Frequently Asked Questions  If you are at high-risk for lung cancer, getting screened with low-dose computed tomography (LDCT) every year can help save your life. This handout offers answers to some of the most common questions about lung cancer screening. If you have other questions, please call [phone] ([phone]).     What is it?  Lung cancer screening uses special X-ray technology to create an image of your lung tissue. The exam is quick and easy and takes less than 10 seconds. We don t give you any medicine or use any needles. You can eat before and after the exam. You don t need to change your clothes as long as the clothing on your chest doesn t contain metal. But, you do need to be able to hold your breath for at least 6 seconds during the exam.    What is the goal of lung cancer screening?  The goal of lung cancer screening is to save lives. Many times, lung cancer is not found until a person starts having physical symptoms. Lung cancer screening can help detect lung cancer in the earliest stages when it may be easier to treat.    Who should be screened for lung cancer?  We suggest lung cancer screening for anyone who is at high-risk for lung cancer. You are in the high-risk group if you:     are between the ages of 55 and 79, and   have smoked at least 1 pack of cigarettes a day for 20 or more years, and   still smoke or have quit within the past 15 years.    However, if you have a new cough or shortness of breath, you should talk to your doctor before being screened.    Why does it matter if I have symptoms?  Certain symptoms can be a sign that you have a condition in your  lungs that should be checked and treated by your doctor. These symptoms include fever, chest pain, a new or changing cough, shortness of breath that you have never felt before, coughing up blood or unexplained weight loss. Having any of these symptoms can greatly affect the results of lung cancer screening.       Should all smokers get an LDCT lung cancer screening exam?  It depends. Lung cancer screening is for a very specific group of men and women who have a history of heavy smoking over a long period of time (see  Who should be screened for lung cancer  above).  I am in the high-risk group, but have been diagnosed with cancer in the past. Is LDCT lung cancer screening right for me?  In some cases, you should not have LDCT lung screening, such as when your doctor is already following your cancer with CT scan studies. Your doctor will help you decide if LDCT lung screening is right for you.  Do I need to have a screening exam every year?  Yes. If you are in the high-risk group described earlier, you should get an LDCT lung cancer screening exam every year until you are 79, or are no longer willing or able to undergo screening and possible procedures to diagnose and treat lung cancer.  How effective is LDCT at preventing death from lung cancer?  Studies have shown that LDCT lung cancer screening can lower the risk of death from lung cancer by 20 percent in people who are at high-risk.  What are the risks?  There are some risks and limitations of LDCT lung cancer screening. We want to make sure you understand the risks and benefits, so please let us know if you have any questions. Your doctor may want to talk with you more about these risks.   Radiation exposure: As with any exam that uses radiation, there is a very small increased risk of cancer. The amount of radiation in LDCT is small--about the same amount a person would get from a mammogram. Your doctor orders the exam when he or she feels the potential  benefits outweigh the risks.   False negatives: No test is perfect, including LDCT. It is possible that you may have a medical condition, including lung cancer, that is not found during your exam. This is called a false negative result.   False positives and more testing: LDCT very often finds something in the lung that could be cancer, but in fact is not. This is called a false positive result. False positive tests often cause anxiety. To make sure these findings are not cancer, you may need to have more tests. These tests will be done only if you give us permission. Sometimes patients need a treatment that can have side effects, such as a biopsy. For more information on false positives, see  What can I expect from the results?    Findings not related to lung cancer: Your LDCT exam also takes pictures of areas of your body next to your lungs. In a very small number of cases, the CT scan will show an abnormal finding in one of these areas, such as your kidneys, adrenal glands, liver or thyroid. This finding may not be serious, but you may need more tests. Your doctor can help you decide what other tests you may need, if any.  What can I expect from the results?  About 1 out of 4 LDCT exams will find something that may need more tests. Most of the time, these findings are lung nodules. Lung nodules are very small collections of tissue in the lung. These nodules are very common, and the vast majority--more than 97 percent--are not cancer (benign). Most are normal lymph nodes or small areas of scarring from past infections.  But, if a small lung nodule is found to be cancer, the cancer can be cured more than 90 percent of the time. To know if the nodule is cancer, we may need to get more images before your next yearly screening exam. If the nodule has suspicious features (for example, it is large, has an odd shape or grows over time), we will refer you to a specialist for further testing.  Will my doctor also get the  results?  Yes. Your doctor will get a copy of your results.  Is it okay to keep smoking now that there s a cancer screening exam?  No. Tobacco is one of the strongest cancer-causing agents. It causes not only lung cancer, but other cancers and cardiovascular (heart) diseases as well. The damage caused by smoking builds over time. This means that the longer you smoke, the higher your risk of disease. While it is never too late to quit, the sooner you quit, the better.  Where can I find help to quit smoking?  The best way to prevent lung cancer is to stop smoking. If you have already quit smoking, congratulations and keep it up! For help on quitting smoking, please call [employer] at 6-823-QUITNOW ([phone]) or the American Cancer Society at 1-941.190.7999 to find local resources near you.  One-on-one health coaching:  If you d prefer to work individually with a health care provider on tobacco cessation, we offer:     Medication Therapy Management:  Our specially trained pharmacists work closely with you and your doctor to help you quit smoking.  Call [phone] or [phone] (toll free).

## 2025-07-08 ENCOUNTER — HOSPITAL ENCOUNTER (OUTPATIENT)
Dept: ULTRASOUND IMAGING | Facility: HOSPITAL | Age: 65
Discharge: HOME | End: 2025-07-08
Attending: FAMILY MEDICINE
Payer: COMMERCIAL

## 2025-07-08 ENCOUNTER — HOSPITAL ENCOUNTER (OUTPATIENT)
Dept: CT IMAGING | Facility: HOSPITAL | Age: 65
Discharge: HOME | End: 2025-07-08
Attending: FAMILY MEDICINE
Payer: COMMERCIAL

## 2025-07-08 ENCOUNTER — TELEPHONE (OUTPATIENT)
Dept: INTERVENTIONAL RADIOLOGY/VASCULAR | Facility: HOSPITAL | Age: 65
End: 2025-07-08

## 2025-07-08 DIAGNOSIS — F17.200 TOBACCO USE DISORDER: ICD-10-CM

## 2025-07-08 DIAGNOSIS — Z87.891 PERSONAL HISTORY OF TOBACCO USE: ICD-10-CM

## 2025-07-08 PROCEDURE — 71271 CT THORAX LUNG CANCER SCR C-: CPT

## 2025-07-08 PROCEDURE — 76706 US ABDL AORTA SCREEN AAA: CPT

## 2025-07-08 PROCEDURE — 71271 CT THORAX LUNG CANCER SCR C-: CPT | Mod: 26 | Performed by: RADIOLOGY

## 2025-07-08 NOTE — PROVIDER NOTIFICATION
Reminder call of lexiscan on 7/09/2025. Pt instructed to arrive by 0615, no caffeine for 12 hours and no food for 3 hours prior to arrival. Pt verbalized understanding.

## 2025-07-28 NOTE — PROGRESS NOTES
Assessment & Plan     Benign essential hypertension  BP at goal. No issues with lightheadness / dizziness. Renal function stable  - Comprehensive metabolic panel; Future  - Comprehensive metabolic panel  - c/w lisinopril (ZESTRIL) 20 MG tablet; Take 1 tablet (20 mg) by mouth daily.    Mixed hyperlipidemia  LFTs wnl. LDL today of 61  - Comprehensive metabolic panel; Future  - Comprehensive metabolic panel  - rosuvastatin (CRESTOR) 10 MG tablet; Take 1 tablet (10 mg) by mouth daily.  - Lipid Profile (Chol, Trig, HDL, LDL calc)    EGAN (dyspnea on exertion)  Resolved. Most likely d/t improved diet    Chronic pain of both knees  Improved. Most likely d/t cutting out sugar  - c/w voltaren gel    Tobacco use disorder  Brand name. Generic results in a rash   - nicotine (NICODERM CQ) 21 MG/24HR 24 hr patch; Place 1 patch over 24 hours onto the skin every 24 hours. Brand name    The longitudinal plan of care for the diagnosis(es)/condition(s) as documented were addressed during this visit. Due to the added complexity in care, I will continue to support Pedro in the subsequent management and with ongoing continuity of care.    Follow-up  Return in about 1 year (around 7/29/2026) for Physical Exam, Lab Work, CDM.    Subjective   Pedro is a 65 year old, presenting for the following health issues:  Hypertension and Lipids        7/29/2025     9:13 AM   Additional Questions   Roomed by Joaquin lpn   Accompanied by self     History of Present Illness       Hyperlipidemia:  He presents for follow up of hyperlipidemia.   He is taking medication to lower cholesterol. He is not having myalgia or other side effects to statin medications.    Hypertension: He presents for follow up of hypertension.  He does not check blood pressure  regularly outside of the clinic. Outside blood pressures have been over 140/90. He follows a low salt diet.     He eats 0-1 servings of fruits and vegetables daily.He consumes 1 sweetened beverage(s) daily.He  exercises with enough effort to increase his heart rate 60 or more minutes per day.  He exercises with enough effort to increase his heart rate 4 days per week.   He is taking medications regularly.          Hyperlipidemia Follow-Up    Are you regularly taking any medication or supplement to lower your cholesterol?   Yes- rosuvastatin  Are you having muscle aches or other side effects that you think could be caused by your cholesterol lowering medication?  No  - restarted crestor - no issues     Hypertension Follow-up    Do you check your blood pressure regularly outside of the clinic? No   Are you following a low salt diet? Yes  Are your blood pressures ever more than 140 on the top number (systolic) OR more   than 90 on the bottom number (diastolic), for example 140/90? Yes    - started lisinopril - no issues   - stopped sugar cold turkey, resulting in headache  - no issues with lightheadness / dizziness     BP Readings from Last 2 Encounters:   07/29/25 112/74   06/24/25 (!) 150/90       # EGAN  - stress test ordered - Pedro cancel the stress test d/t no heart pain and EGAN has improved     # Knee pain   - stopped sugar   - voltaren gel is helping     # smoking   - rash with generic nicotine patches     # colon cancer screening: declines     Review of Systems  Constitutional, HEENT, cardiovascular, pulmonary, gi and gu systems are negative, except as otherwise noted.      Objective    /74 (BP Location: Right arm, Patient Position: Sitting, Cuff Size: Adult Regular)   Pulse 62   Temp 97  F (36.1  C) (Tympanic)   Resp 18   Wt 80.5 kg (177 lb 6.4 oz)   SpO2 98%   BMI 26.97 kg/m    Body mass index is 26.97 kg/m .  Physical Exam  Constitutional:       General: He is not in acute distress.     Appearance: He is not ill-appearing.   Cardiovascular:      Rate and Rhythm: Normal rate and regular rhythm.      Heart sounds: No murmur heard.  Pulmonary:      Effort: Pulmonary effort is normal. No respiratory  distress.      Breath sounds: No wheezing or rales.   Musculoskeletal:      Right lower leg: No edema.      Left lower leg: No edema.   Neurological:      Mental Status: He is alert.   Psychiatric:         Mood and Affect: Mood normal.            Recent Results (from the past 24 hours)   Comprehensive metabolic panel   Result Value Ref Range    Sodium 138 135 - 145 mmol/L    Potassium 4.5 3.4 - 5.3 mmol/L    Carbon Dioxide (CO2) 23 22 - 29 mmol/L    Anion Gap 12 7 - 15 mmol/L    Urea Nitrogen 16.6 8.0 - 23.0 mg/dL    Creatinine 1.07 0.67 - 1.17 mg/dL    GFR Estimate 76 >60 mL/min/1.73m2    Calcium 9.6 8.8 - 10.4 mg/dL    Chloride 103 98 - 107 mmol/L    Glucose 98 70 - 99 mg/dL    Alkaline Phosphatase 68 40 - 150 U/L    AST 21 0 - 45 U/L    ALT 22 0 - 70 U/L    Protein Total 7.3 6.4 - 8.3 g/dL    Albumin 4.3 3.5 - 5.2 g/dL    Bilirubin Total 1.0 <=1.2 mg/dL   Lipid Profile (Chol, Trig, HDL, LDL calc)   Result Value Ref Range    Cholesterol 137 <200 mg/dL    Triglycerides 93 <150 mg/dL    Direct Measure HDL 57 >=40 mg/dL    LDL Cholesterol Calculated 61 <100 mg/dL    Non HDL Cholesterol 80 <130 mg/dL    Patient Fasting > 8hrs? Unknown     Narrative    Cholesterol  Desirable: < 200 mg/dL  Borderline High: 200 - 239 mg/dL  High: >= 240 mg/dL    Triglycerides  Normal: < 150 mg/dL  Borderline High: 150 - 199 mg/dL  High: 200-499 mg/dL  Very High: >= 500 mg/dL    Direct Measure HDL  Female: >= 50 mg/dL   Male: >= 40 mg/dL    LDL Cholesterol  Desirable: < 100 mg/dL  Above Desirable: 100 - 129 mg/dL   Borderline High: 130 - 159 mg/dL   High:  160 - 189 mg/dL   Very High: >= 190 mg/dL    Non HDL Cholesterol  Desirable: < 130 mg/dL  Above Desirable: 130 - 159 mg/dL  Borderline High: 160 - 189 mg/dL  High: 190 - 219 mg/dL  Very High: >= 220 mg/dL           Signed Electronically by: Kasia Diggs MD

## 2025-07-29 ENCOUNTER — OFFICE VISIT (OUTPATIENT)
Dept: FAMILY MEDICINE | Facility: OTHER | Age: 65
End: 2025-07-29
Attending: FAMILY MEDICINE
Payer: COMMERCIAL

## 2025-07-29 ENCOUNTER — APPOINTMENT (OUTPATIENT)
Dept: LAB | Facility: OTHER | Age: 65
End: 2025-07-29
Attending: FAMILY MEDICINE
Payer: COMMERCIAL

## 2025-07-29 ENCOUNTER — TELEPHONE (OUTPATIENT)
Dept: FAMILY MEDICINE | Facility: OTHER | Age: 65
End: 2025-07-29

## 2025-07-29 VITALS
RESPIRATION RATE: 18 BRPM | HEART RATE: 62 BPM | DIASTOLIC BLOOD PRESSURE: 74 MMHG | BODY MASS INDEX: 26.97 KG/M2 | SYSTOLIC BLOOD PRESSURE: 112 MMHG | WEIGHT: 177.4 LBS | OXYGEN SATURATION: 98 % | TEMPERATURE: 97 F

## 2025-07-29 DIAGNOSIS — M25.562 CHRONIC PAIN OF BOTH KNEES: ICD-10-CM

## 2025-07-29 DIAGNOSIS — R06.09 DOE (DYSPNEA ON EXERTION): ICD-10-CM

## 2025-07-29 DIAGNOSIS — M25.561 CHRONIC PAIN OF BOTH KNEES: ICD-10-CM

## 2025-07-29 DIAGNOSIS — E78.2 MIXED HYPERLIPIDEMIA: ICD-10-CM

## 2025-07-29 DIAGNOSIS — I10 BENIGN ESSENTIAL HYPERTENSION: Primary | ICD-10-CM

## 2025-07-29 DIAGNOSIS — G89.29 CHRONIC PAIN OF BOTH KNEES: ICD-10-CM

## 2025-07-29 DIAGNOSIS — F17.200 TOBACCO USE DISORDER: ICD-10-CM

## 2025-07-29 LAB
ALBUMIN SERPL BCG-MCNC: 4.3 G/DL (ref 3.5–5.2)
ALP SERPL-CCNC: 68 U/L (ref 40–150)
ALT SERPL W P-5'-P-CCNC: 22 U/L (ref 0–70)
ANION GAP SERPL CALCULATED.3IONS-SCNC: 12 MMOL/L (ref 7–15)
AST SERPL W P-5'-P-CCNC: 21 U/L (ref 0–45)
BILIRUB SERPL-MCNC: 1 MG/DL
BUN SERPL-MCNC: 16.6 MG/DL (ref 8–23)
CALCIUM SERPL-MCNC: 9.6 MG/DL (ref 8.8–10.4)
CHLORIDE SERPL-SCNC: 103 MMOL/L (ref 98–107)
CHOLEST SERPL-MCNC: 137 MG/DL
CREAT SERPL-MCNC: 1.07 MG/DL (ref 0.67–1.17)
EGFRCR SERPLBLD CKD-EPI 2021: 76 ML/MIN/1.73M2
FASTING STATUS PATIENT QL REPORTED: NORMAL
GLUCOSE SERPL-MCNC: 98 MG/DL (ref 70–99)
HCO3 SERPL-SCNC: 23 MMOL/L (ref 22–29)
HDLC SERPL-MCNC: 57 MG/DL
LDLC SERPL CALC-MCNC: 61 MG/DL
NONHDLC SERPL-MCNC: 80 MG/DL
POTASSIUM SERPL-SCNC: 4.5 MMOL/L (ref 3.4–5.3)
PROT SERPL-MCNC: 7.3 G/DL (ref 6.4–8.3)
SODIUM SERPL-SCNC: 138 MMOL/L (ref 135–145)
TRIGL SERPL-MCNC: 93 MG/DL

## 2025-07-29 RX ORDER — LISINOPRIL 20 MG/1
20 TABLET ORAL DAILY
Qty: 90 TABLET | Refills: 3 | Status: SHIPPED | OUTPATIENT
Start: 2025-07-29

## 2025-07-29 RX ORDER — ROSUVASTATIN CALCIUM 10 MG/1
10 TABLET, COATED ORAL DAILY
Qty: 90 TABLET | Refills: 3 | Status: SHIPPED | OUTPATIENT
Start: 2025-07-29 | End: 2025-07-29

## 2025-07-29 RX ORDER — NICOTINE 21 MG/24HR
1 PATCH, TRANSDERMAL 24 HOURS TRANSDERMAL EVERY 24 HOURS
Qty: 28 PATCH | Refills: 2 | Status: SHIPPED | OUTPATIENT
Start: 2025-07-29 | End: 2025-07-29

## 2025-07-29 RX ORDER — ROSUVASTATIN CALCIUM 10 MG/1
10 TABLET, COATED ORAL DAILY
Qty: 90 TABLET | Refills: 3 | Status: SHIPPED | OUTPATIENT
Start: 2025-07-29

## 2025-07-29 RX ORDER — NICOTINE 21 MG/24HR
1 PATCH, TRANSDERMAL 24 HOURS TRANSDERMAL EVERY 24 HOURS
Qty: 28 PATCH | Refills: 2 | Status: SHIPPED | OUTPATIENT
Start: 2025-07-29

## 2025-07-29 ASSESSMENT — PAIN SCALES - GENERAL: PAINLEVEL_OUTOF10: NO PAIN (0)

## 2025-07-29 NOTE — TELEPHONE ENCOUNTER
11:36 AM    Reason for Call: Phone Call    Description: Rosalinda called on a medication for nicotine patch but no brand name was on prescription it did say do name brand but no brand name on it    Was an appointment offered for this call? No  If yes : Appointment type              Date    Preferred method for responding to this message: Telephone Call  What is your phone number ? [phone]     If we cannot reach you directly, may we leave a detailed response at the number you provided? Yes    Can this message wait until your PCP/provider returns, if available today? Ana Nix